# Patient Record
Sex: MALE | Race: WHITE
[De-identification: names, ages, dates, MRNs, and addresses within clinical notes are randomized per-mention and may not be internally consistent; named-entity substitution may affect disease eponyms.]

---

## 2019-11-20 ENCOUNTER — HOSPITAL ENCOUNTER (INPATIENT)
Dept: HOSPITAL 53 - M ED | Age: 34
LOS: 2 days | Discharge: HOME | DRG: 755 | End: 2019-11-22
Attending: PSYCHIATRY & NEUROLOGY | Admitting: PSYCHIATRY & NEUROLOGY
Payer: COMMERCIAL

## 2019-11-20 VITALS — WEIGHT: 175.49 LBS | BODY MASS INDEX: 26.6 KG/M2 | HEIGHT: 68 IN

## 2019-11-20 VITALS — DIASTOLIC BLOOD PRESSURE: 82 MMHG | SYSTOLIC BLOOD PRESSURE: 135 MMHG

## 2019-11-20 DIAGNOSIS — Z63.0: ICD-10-CM

## 2019-11-20 DIAGNOSIS — F11.20: ICD-10-CM

## 2019-11-20 DIAGNOSIS — Z79.899: ICD-10-CM

## 2019-11-20 DIAGNOSIS — M19.90: ICD-10-CM

## 2019-11-20 DIAGNOSIS — Z96.652: ICD-10-CM

## 2019-11-20 DIAGNOSIS — I10: ICD-10-CM

## 2019-11-20 DIAGNOSIS — F43.10: ICD-10-CM

## 2019-11-20 DIAGNOSIS — F43.20: Primary | ICD-10-CM

## 2019-11-20 DIAGNOSIS — F32.9: ICD-10-CM

## 2019-11-20 DIAGNOSIS — F17.210: ICD-10-CM

## 2019-11-20 LAB
ALBUMIN SERPL BCG-MCNC: 3.8 GM/DL (ref 3.2–5.2)
ALT SERPL W P-5'-P-CCNC: 38 U/L (ref 12–78)
AMPHETAMINES UR QL SCN: NEGATIVE
APAP SERPL-MCNC: < 2 UG/ML (ref 10–30)
BARBITURATES UR QL SCN: NEGATIVE
BENZODIAZ UR QL SCN: NEGATIVE
BILIRUB CONJ SERPL-MCNC: < 0.1 MG/DL (ref 0–0.2)
BILIRUB SERPL-MCNC: 0.2 MG/DL (ref 0.2–1)
BUN SERPL-MCNC: 9 MG/DL (ref 7–18)
BZE UR QL SCN: NEGATIVE
CALCIUM SERPL-MCNC: 9.2 MG/DL (ref 8.5–10.1)
CANNABINOIDS UR QL SCN: POSITIVE
CHLORIDE SERPL-SCNC: 106 MEQ/L (ref 98–107)
CO2 SERPL-SCNC: 31 MEQ/L (ref 21–32)
CREAT SERPL-MCNC: 0.96 MG/DL (ref 0.7–1.3)
ETHANOL SERPL-MCNC: < 0.003 % (ref 0–0.01)
GFR SERPL CREATININE-BSD FRML MDRD: > 60 ML/MIN/{1.73_M2} (ref 60–?)
GLUCOSE SERPL-MCNC: 103 MG/DL (ref 70–100)
HCT VFR BLD AUTO: 47.5 % (ref 42–52)
HGB BLD-MCNC: 15.8 G/DL (ref 13.5–17.5)
MCH RBC QN AUTO: 31.1 PG (ref 27–33)
MCHC RBC AUTO-ENTMCNC: 33.3 G/DL (ref 32–36.5)
MCV RBC AUTO: 93.5 FL (ref 80–96)
METHADONE UR QL SCN: NEGATIVE
OPIATES UR QL SCN: NEGATIVE
PCP UR QL SCN: NEGATIVE
PLATELET # BLD AUTO: 274 10^3/UL (ref 150–450)
POTASSIUM SERPL-SCNC: 4.8 MEQ/L (ref 3.5–5.1)
PROT SERPL-MCNC: 7.5 GM/DL (ref 6.4–8.2)
RBC # BLD AUTO: 5.08 10^6/UL (ref 4.3–6.1)
SALICYLATES SERPL-MCNC: 2.7 MG/DL (ref 5–30)
SODIUM SERPL-SCNC: 141 MEQ/L (ref 136–145)
TSH SERPL DL<=0.005 MIU/L-ACNC: 0.43 UIU/ML (ref 0.36–3.74)
WBC # BLD AUTO: 10.8 10^3/UL (ref 4–10)

## 2019-11-20 SDOH — SOCIAL STABILITY - SOCIAL INSECURITY: PROBLEMS IN RELATIONSHIP WITH SPOUSE OR PARTNER: Z63.0

## 2019-11-21 VITALS — SYSTOLIC BLOOD PRESSURE: 140 MMHG | DIASTOLIC BLOOD PRESSURE: 100 MMHG

## 2019-11-21 VITALS — DIASTOLIC BLOOD PRESSURE: 84 MMHG | SYSTOLIC BLOOD PRESSURE: 158 MMHG

## 2019-11-21 VITALS — DIASTOLIC BLOOD PRESSURE: 84 MMHG | SYSTOLIC BLOOD PRESSURE: 124 MMHG

## 2019-11-21 RX ADMIN — BUPRENORPHINE AND NALOXONE SCH TAB: 8; 2 TABLET SUBLINGUAL at 15:20

## 2019-11-21 RX ADMIN — VENLAFAXINE HYDROCHLORIDE SCH MG: 75 CAPSULE, EXTENDED RELEASE ORAL at 10:21

## 2019-11-21 RX ADMIN — BUPRENORPHINE AND NALOXONE SCH TAB: 8; 2 TABLET SUBLINGUAL at 20:39

## 2019-11-21 RX ADMIN — NICOTINE SCH PATCH: 21 PATCH, EXTENDED RELEASE TRANSDERMAL at 10:21

## 2019-11-21 NOTE — HPE
DATE OF ADMISSION: 11/20/2019

 

The patient is admitted to the inpatient mental health unit due to depression.

 

HISTORY OF PRESENT ILLNESS: This a 34-year-old male with history of 
polysubstance

abuse, undergoing Suboxone detoxification, active tobacco use. He denies any

medical complaints aside from withdrawal symptoms which is occasional nausea,

generalized malaise and aches, and occasional chills. No fever, dysuria, 
urgency,

frequency, chest pain, pressure or tightness, shortness of breath, palpitations,

lightheadedness, dizziness, upper or lower extremity weakness, gait ataxia,

changes in vision, changes in appetite, insomnia, hypersomnia.

 

PAST MEDICAL HISTORY: Suboxone detoxification, osteoarthritis.

 

PAST SURGICAL HISTORY: Left knee arthroscopy.

 

ALLERGIES: No known drug allergies.

 

HOSPITAL MEDICATIONS:

- acetaminophen 650 as needed for headache

- Mylanta 30 mL every four hours as needed for heartburn

- Suboxone one tablet sublingually twice a day

- flu shot

- milk of magnesia 30 mL daily as needed for constipation

- nicotine patch 21 mg daily

- trazodone 50 mg at bedtime as needed for insomnia

- Effexor 75 mg daily

 

SOCIAL HISTORY: The patient smokes a pack a day since age of 16. No alcohol use.

Recreational drug use with opiates. Currently on disability, previously a

.

 

FAMILY HISTORY: Father and mother alive, unknown medical problems. One sister 
and

one brother alive and well.

 

PHYSICAL EXAMINATION:

Temperature 98.5, pulse 77, respiratory rate 21, blood pressure 124/84, 100% on

room air.

GENERAL: Awake, alert, oriented to person, place and time.

Anicteric sclerae. No jaundice. No icterus. Moist mucous membranes.

Lungs are clear to auscultation. No wheezing, rales, or rhonchi.

HEART: S1, S2, sinus rhythm.

Abdomen is soft, nontender, nondistended.

EXTREMITIES: No clubbing, cyanosis, or any pitting edema.

 

LABORATORY DATA: White count 10, hemoglobin 15, hematocrit 47, platelet count

274.

 

Sodium 141, potassium 4.8, chloride 106, bicarbonate 31, BUN 9, creatinine 0.96,

glucose 103, calcium 9.2, total bilirubin 0.2, direct bilirubin less than 0.1,

AST 20, ALT 38, alkaline phosphatase 85, total protein 7.5, albumin 3.8, TSH

0.426.

 

Toxicology screen positive for cannabinoids.

 

ASSESSMENT AND PLAN: This is a 34-year-old male with history of polysubstance

abuse, positive marijuana, recreational drug use with opiates, active smoking,

admitted to the inpatient mental health unit, currently on Suboxone

detoxification.

 

IMPRESSION:

1. Depression, managed by psychiatrist. Continue all recommended medications.

 

2. History of osteoarthritis with left knee arthroscopy. No acute complaints.

 

3. Diet. Regular diet.

 

4. Deep vein thrombosis (DVT) prophylaxis. Encourage ambulation.

MTDD

## 2019-11-21 NOTE — MHHPEPDOC
[facility] History & Physical


History and Physical


DATE OF ADMISSION: Nov 20, 2019 at 22:00








New Patient


Leonides Fisher


MRN: N/A


Date of Birth: N/A


Date of Service: 11/21/2019


Chief Complaint


"I had a moment."





History of Present Illness


Patient is a 34-year-old man with a history of PTSD who presents after 

reportedly getting suicidal thoughts for short period that were primarily 

hopelessness. When further asked and described, the patient reports that he was 

under much stress with multiple financial stressors and his wife having 

addiction.





When I met with the patient, he reported that having good sleep and being 

restarted on his venlafaxine that he had felt was very helpful, he was feeling 

much improved. The patient states that he had had multiple stressors and that 

being  from them had helped him greatly. He reports that his initial 

depressed mood, irritability and insomnia that were present have resolved. The 

patient reports having a history of PTSD with combat and subsequent avoidance, 

hypervigilance and negative cognition about the future. He reports he has 

recently moved up from [place] and that he has been without his medications. He 

has been attempting to get medications through [facility] and has been 

in the Suboxone outpatient program.





Review Of Systems


Depression: As above.





Anxiety: Trauma related stressors and situational increased anxiety.





[place]: The patient denies any episodes of euphoria/dysphoria associated with 

decreased need for sleep, hedonism, talkatively or impulsivity lasting longer 

than 5 days.





Psychotic: The patient denies any experiences of auditory or visual 

hallucinations. They deny any episodes of paranoia or delusional thinking in the

past





Trauma: As above.





Borderline: The patient screens negative for borderline personality at this 

junction.





Past Psychiatric History


The patient reports having diagnosis of PTSD, currently on venlafaxine, 

previously on sertraline, has had outpatient in [place], reports having 1 

admission in the past. Denies any suicidal thoughts.





Allergies


Please see below.





Family Psychiatric History


The patient denies any history of mental health diagnoses, addictions or suicide

in the family.





Social History


The patient lives in the local area. He is  to his spouse, has 2 

children, subsists on [employer] disability for PTSD, has had a DUI in the past, grew 

up in a family with an estranged relationship to his father who he has no 

contact with. He has good relationship with mother, served in the army for 4 

years and was honorably discharged.





Substance Abuse History


The patient reports having an opioid dependence history, tobacco use and 

currently follows at outpatient addiction in [place].





Medical History


Has a history of hypertension.





Mental Status Examination


General: Well dressed with good hygiene





Speech: Spontaneous and fluid





Thought processes: Linear and logical





MSK: Smooth and coordinated gait, no signs of tremors or involuntary orofacial 

movements





Thought content: Future orientated





Abstract reasoning, and computation: Intact





Description of associations: Intact





Description of abnormal or psychotic thoughts: Denies any suicidal or homicidal 

ideation. Denies any auditory or visual hallucinations. Does not appear to be r

esponding to internal stimuli. Does not appear to be endorsing any bizarre or 

paranoid ideation.





Judgment: fair





Insight: fair





Orientation: Alert and orientated 3





Cognition: Grossly normal





Recent and remote memory: Intact





Attention span and concentration: Intact





Fund of knowledge: Adequate





Mood: "okay"





Affect: Euthymic with a full range





Diagnoses


Adjustment disorder, mild.





PTSD, chronic.





Opioid use disorder, on maintenance treatment.





Tobacco use disorder, severe.





Assessment and Plan


PTSD: We'll restart home medications.





Opioid use disorder: Restart home medications.





Disposition


Discharge tomorrow. Does not meet involuntary criteria for extension of his 

admission past 48 hours.





Problem List


1. Risk for suicide.





2. Depression.





3. Ineffective coping.





Initial Treatment Plan


1. Patient was admitted on a 9.39 legal status. 


2. Complete history was obtained.


3. With patients permission, family will be contacted and database will be 

expanded. 


4. Patients medication regimen will be reviewed and changed accordingly. 


5. Patient will be provided with protected environment. 


6. Patient will be treated with individual, group, and milieu therapies. 


7. Patient will receive supportive psych-education.


8. Discharge planning will commence immediately.


9. Outpatient follow-up treatment will be strongly recommended.


10. The initial treatment plan will focus initially on:





Estimated Length Of Stay


2 days.





Time Spent


70 minutes.





Thursday





Vital Signs





Vital Signs








  Date Time  Temp Pulse Resp B/P (MAP) Pulse Ox O2 Delivery O2 Flow Rate FiO2


 


11/21/19 06:52 98.5 77 12 124/84 (97)  Room Air  


 


11/20/19 18:48     100   











Laboratory Data


[facility]


Laboratory Tests 2


11/20/19 17:41: 


Nucleated Red Blood Cells % (auto) 0.0, Anion Gap 4L, Glomerular Filtration Rate

> 60.0, Calcium Level 9.2, Total Bilirubin 0.2, Direct Bilirubin < 0.1, 

Aspartate Amino Transf (AST/SGOT) 20, Alanine Aminotransferase (ALT/SGPT) 38, 

Alkaline Phosphatase 85, Total Protein 7.5, Albumin 3.8, Albumin/Globulin Ratio 

1.03, Thyroid Stimulating Hormone (TSH) 0.426, Salicylates Level 2.7L, Urine O

piates Screen NEGATIVE, Urine Methadone Screen NEGATIVE, Acetaminophen Level < 

2.0L, Urine Barbiturates Screen NEGATIVE, Urine Phencyclidine Screen NEGATIVE, 

Urine Amphetamines Screen NEGATIVE, Urine Benzodiazepines Screen NEGATIVE, Urine

Cocaine Metabolite Screen NEGATIVE, Urine Cannabinoids Screen POSITIVEH, Ethyl 

Alcohol Level < 0.003


CBC/BMP


Laboratory Tests


11/20/19 17:41











Medications


Scheduled


Buprenorphine HCl/Naloxone HCl (Suboxone 8 mg-2 mg Sl Film) 1 Each Film, 1 STRIP

SL BID for .


Lisinopril/Hydrochlorothiazide (Lisinopril-Hctz 10-12.5 mg Tab) 1 Each Tablet, 1

TAB PO DAILY for .


Omeprazole (Omeprazole) 20 Mg Capsule.dr, 20 MG PO DAILY, (Reported)


Trazodone HCl (Trazodone HCl) 50 Mg Tablet, 1 TAB PO QPM for sleep


Venlafaxine HCl (Venlafaxine HCl ER) 75 Mg Cap.er.24h, 75 MG PO DAILY for mood





Scheduled PRN


Naloxone HCl (Narcan) 4 Mg Spray, 4 MG NA ASDIRECTED PRN for OVERDOSE, (Reporte

d)


Naproxen Sodium (Aleve) 220 Mg Tablet, 440 MG PO BID PRN for PAIN, (Reported)





Allergies


Coded Allergies:  


     No Known Allergies (Verified  Allergy, Unknown, 11/20/19)











CROW ANDERSON DO              Nov 21, 2019 09:17

## 2019-11-22 VITALS — DIASTOLIC BLOOD PRESSURE: 65 MMHG | SYSTOLIC BLOOD PRESSURE: 119 MMHG

## 2019-11-22 RX ADMIN — NICOTINE SCH PATCH: 21 PATCH, EXTENDED RELEASE TRANSDERMAL at 08:09

## 2019-11-22 RX ADMIN — VENLAFAXINE HYDROCHLORIDE SCH MG: 75 CAPSULE, EXTENDED RELEASE ORAL at 08:09

## 2019-11-22 RX ADMIN — BUPRENORPHINE AND NALOXONE SCH TAB: 8; 2 TABLET SUBLINGUAL at 08:09

## 2019-12-21 ENCOUNTER — HOSPITAL ENCOUNTER (EMERGENCY)
Dept: HOSPITAL 53 - M ED | Age: 34
Discharge: PSYCHIATRIC OR SUBSTANCE USE FACILITY | End: 2019-12-21
Payer: MEDICAID

## 2019-12-21 VITALS — HEIGHT: 69 IN | BODY MASS INDEX: 26.72 KG/M2 | WEIGHT: 180.38 LBS

## 2019-12-21 VITALS — DIASTOLIC BLOOD PRESSURE: 74 MMHG | SYSTOLIC BLOOD PRESSURE: 140 MMHG

## 2019-12-21 DIAGNOSIS — F11.20: ICD-10-CM

## 2019-12-21 DIAGNOSIS — F43.10: ICD-10-CM

## 2019-12-21 DIAGNOSIS — M19.90: ICD-10-CM

## 2019-12-21 DIAGNOSIS — F17.210: ICD-10-CM

## 2019-12-21 DIAGNOSIS — Z79.899: ICD-10-CM

## 2019-12-21 DIAGNOSIS — F43.20: ICD-10-CM

## 2019-12-21 DIAGNOSIS — R45.851: Primary | ICD-10-CM

## 2019-12-21 LAB
ALBUMIN SERPL BCG-MCNC: 4.1 GM/DL (ref 3.2–5.2)
ALT SERPL W P-5'-P-CCNC: 29 U/L (ref 12–78)
AMPHETAMINES UR QL SCN: POSITIVE
APAP SERPL-MCNC: < 2 UG/ML (ref 10–30)
BARBITURATES UR QL SCN: NEGATIVE
BENZODIAZ UR QL SCN: NEGATIVE
BILIRUB CONJ SERPL-MCNC: < 0.1 MG/DL (ref 0–0.2)
BILIRUB SERPL-MCNC: 0.3 MG/DL (ref 0.2–1)
BUN SERPL-MCNC: 7 MG/DL (ref 7–18)
BZE UR QL SCN: NEGATIVE
CALCIUM SERPL-MCNC: 9.1 MG/DL (ref 8.5–10.1)
CANNABINOIDS UR QL SCN: POSITIVE
CHLORIDE SERPL-SCNC: 107 MEQ/L (ref 98–107)
CO2 SERPL-SCNC: 26 MEQ/L (ref 21–32)
CREAT SERPL-MCNC: 0.97 MG/DL (ref 0.7–1.3)
ETHANOL SERPL-MCNC: < 0.003 % (ref 0–0.01)
GFR SERPL CREATININE-BSD FRML MDRD: > 60 ML/MIN/{1.73_M2} (ref 60–?)
GLUCOSE SERPL-MCNC: 93 MG/DL (ref 70–100)
HCT VFR BLD AUTO: 55 % (ref 42–52)
HGB BLD-MCNC: 18.3 G/DL (ref 13.5–17.5)
MCH RBC QN AUTO: 30.4 PG (ref 27–33)
MCHC RBC AUTO-ENTMCNC: 33.3 G/DL (ref 32–36.5)
MCV RBC AUTO: 91.4 FL (ref 80–96)
METHADONE UR QL SCN: NEGATIVE
OPIATES UR QL SCN: NEGATIVE
PCP UR QL SCN: NEGATIVE
PLATELET # BLD AUTO: 271 10^3/UL (ref 150–450)
POTASSIUM SERPL-SCNC: 4.2 MEQ/L (ref 3.5–5.1)
PROT SERPL-MCNC: 7.9 GM/DL (ref 6.4–8.2)
RBC # BLD AUTO: 6.02 10^6/UL (ref 4.3–6.1)
SALICYLATES SERPL-MCNC: < 1.7 MG/DL (ref 5–30)
SODIUM SERPL-SCNC: 140 MEQ/L (ref 136–145)
TSH SERPL DL<=0.005 MIU/L-ACNC: 0.9 UIU/ML (ref 0.36–3.74)
WBC # BLD AUTO: 11 10^3/UL (ref 4–10)

## 2019-12-21 PROCEDURE — 85027 COMPLETE CBC AUTOMATED: CPT

## 2019-12-21 PROCEDURE — 80307 DRUG TEST PRSMV CHEM ANLYZR: CPT

## 2019-12-21 PROCEDURE — 36415 COLL VENOUS BLD VENIPUNCTURE: CPT

## 2019-12-21 PROCEDURE — 93005 ELECTROCARDIOGRAM TRACING: CPT

## 2019-12-21 PROCEDURE — 80076 HEPATIC FUNCTION PANEL: CPT

## 2019-12-21 PROCEDURE — 84443 ASSAY THYROID STIM HORMONE: CPT

## 2019-12-21 PROCEDURE — 80048 BASIC METABOLIC PNL TOTAL CA: CPT

## 2019-12-21 PROCEDURE — 99285 EMERGENCY DEPT VISIT HI MDM: CPT

## 2019-12-21 NOTE — ECGEPIP
[facility] - ED

                                       

                                       Test Date:    2019

Pat Name:     DAVID NAVARRETE           Department:   

Patient ID:   [id number]                 Room:         -

Gender:       Male                     Technician:   pawan

:          1985               Requested By: NAEL RODRÍGUEZ

Order Number: [id number]     Reading MD:   Chris Royal

                                 Measurements

Intervals                              Axis          

Rate:         88                       P:            42

KS:           124                      QRS:          46

QRSD:         75                       T:            59

QT:           337                                    

QTc:          409                                    

                           Interpretive Statements

SINUS RHYTHM

NONSPECIFIC T-WAVE ABNORMALITY

Comparison tracing not on file

Electronically Signed on 2019 19:07:51 EST by Chris Royal

## 2021-06-27 ENCOUNTER — HOSPITAL ENCOUNTER (EMERGENCY)
Dept: HOSPITAL 53 - M ED | Age: 36
LOS: 1 days | Discharge: ANOTHER HOSPITAL | End: 2021-06-28
Payer: COMMERCIAL

## 2021-06-27 VITALS — HEIGHT: 68 IN | BODY MASS INDEX: 24.3 KG/M2 | WEIGHT: 160.34 LBS

## 2021-06-27 DIAGNOSIS — E87.1: ICD-10-CM

## 2021-06-27 DIAGNOSIS — M25.511: ICD-10-CM

## 2021-06-27 DIAGNOSIS — L03.115: Primary | ICD-10-CM

## 2021-06-27 DIAGNOSIS — F15.10: ICD-10-CM

## 2021-06-27 DIAGNOSIS — A41.9: ICD-10-CM

## 2021-06-27 LAB
BUN SERPL-MCNC: 24 MG/DL (ref 7–18)
CALCIUM SERPL-MCNC: 8 MG/DL (ref 8.5–10.1)
CHLORIDE SERPL-SCNC: 91 MEQ/L (ref 98–107)
CO2 SERPL-SCNC: 25 MEQ/L (ref 21–32)
CREAT SERPL-MCNC: 0.89 MG/DL (ref 0.7–1.3)
CRP SERPL-MCNC: 15.3 MG/DL (ref 0–0.3)
GFR SERPL CREATININE-BSD FRML MDRD: > 60 ML/MIN/{1.73_M2} (ref 60–?)
GLUCOSE SERPL-MCNC: 109 MG/DL (ref 70–100)
HCT VFR BLD AUTO: 46.8 % (ref 42–52)
HGB BLD-MCNC: 15.7 G/DL (ref 13.5–17.5)
LYMPHOCYTES NFR BLD MANUAL: 10 % (ref 16–44)
MCH RBC QN AUTO: 28.6 PG (ref 27–33)
MCHC RBC AUTO-ENTMCNC: 33.5 G/DL (ref 32–36.5)
MCV RBC AUTO: 85.4 FL (ref 80–96)
MONOCYTES NFR BLD MANUAL: 4 % (ref 0–5)
NEUTROPHILS NFR BLD MANUAL: 83 % (ref 28–66)
PLATELET # BLD AUTO: (no result) 10^3/UL (ref 150–450)
PLATELET BLD QL SMEAR: (no result)
PLATELET CLUMP BLD QL SMEAR: (no result)
POTASSIUM SERPL-SCNC: 3.7 MEQ/L (ref 3.5–5.1)
RBC # BLD AUTO: 5.48 10^6/UL (ref 4.3–6.1)
RBC MORPH BLD: NORMAL
RSV RNA NPH QL NAA+PROBE: NEGATIVE
SODIUM SERPL-SCNC: 128 MEQ/L (ref 136–145)
WBC # BLD AUTO: 16.8 10^3/UL (ref 4–10)

## 2021-06-27 PROCEDURE — 72148 MRI LUMBAR SPINE W/O DYE: CPT

## 2021-06-27 PROCEDURE — 71045 X-RAY EXAM CHEST 1 VIEW: CPT

## 2021-06-27 PROCEDURE — 72141 MRI NECK SPINE W/O DYE: CPT

## 2021-06-27 PROCEDURE — 85025 COMPLETE CBC W/AUTO DIFF WBC: CPT

## 2021-06-27 PROCEDURE — 93041 RHYTHM ECG TRACING: CPT

## 2021-06-27 PROCEDURE — 83605 ASSAY OF LACTIC ACID: CPT

## 2021-06-27 PROCEDURE — 81001 URINALYSIS AUTO W/SCOPE: CPT

## 2021-06-27 PROCEDURE — 96372 THER/PROPH/DIAG INJ SC/IM: CPT

## 2021-06-27 PROCEDURE — 87077 CULTURE AEROBIC IDENTIFY: CPT

## 2021-06-27 PROCEDURE — 96365 THER/PROPH/DIAG IV INF INIT: CPT

## 2021-06-27 PROCEDURE — 96375 TX/PRO/DX INJ NEW DRUG ADDON: CPT

## 2021-06-27 PROCEDURE — 99285 EMERGENCY DEPT VISIT HI MDM: CPT

## 2021-06-27 PROCEDURE — 86140 C-REACTIVE PROTEIN: CPT

## 2021-06-27 PROCEDURE — 73630 X-RAY EXAM OF FOOT: CPT

## 2021-06-27 PROCEDURE — 87040 BLOOD CULTURE FOR BACTERIA: CPT

## 2021-06-27 PROCEDURE — 94760 N-INVAS EAR/PLS OXIMETRY 1: CPT

## 2021-06-27 PROCEDURE — 93005 ELECTROCARDIOGRAM TRACING: CPT

## 2021-06-27 PROCEDURE — 96366 THER/PROPH/DIAG IV INF ADDON: CPT

## 2021-06-27 PROCEDURE — 87186 SC STD MICRODIL/AGAR DIL: CPT

## 2021-06-27 PROCEDURE — 70491 CT SOFT TISSUE NECK W/DYE: CPT

## 2021-06-27 PROCEDURE — 96368 THER/DIAG CONCURRENT INF: CPT

## 2021-06-27 PROCEDURE — 73030 X-RAY EXAM OF SHOULDER: CPT

## 2021-06-27 PROCEDURE — 87631 RESP VIRUS 3-5 TARGETS: CPT

## 2021-06-27 PROCEDURE — 36415 COLL VENOUS BLD VENIPUNCTURE: CPT

## 2021-06-27 PROCEDURE — 72146 MRI CHEST SPINE W/O DYE: CPT

## 2021-06-27 PROCEDURE — 80048 BASIC METABOLIC PNL TOTAL CA: CPT

## 2021-06-27 PROCEDURE — 96361 HYDRATE IV INFUSION ADD-ON: CPT

## 2021-06-27 PROCEDURE — 96367 TX/PROPH/DG ADDL SEQ IV INF: CPT

## 2021-06-27 NOTE — REP
INDICATION:

pain



COMPARISON:

None.



TECHNIQUE:

Three limited views of the right shoulder.



FINDINGS:

No acute fracture or dislocation.  The acromioclavicular and glenohumeral joints are

intact.  No periarticular calcifications or degenerative changes are appreciated.  Sub

acromial space is normal.  Surrounding soft tissues are unremarkable.



IMPRESSION:

No obvious acute fracture or dislocation.  No obvious degenerative changes.





<Electronically signed by Mayco Cardoza > 06/27/21 [id number]

## 2021-06-27 NOTE — REPVR
PROCEDURE INFORMATION: 

Exam: XR Chest 

Exam date and time: 6/27/2021 9:58 PM 

Age: 35 years old 

Clinical indication: Fever; Additional info: Chest pain, fever, lactic acidosis 



TECHNIQUE: 

Imaging protocol: XR of the chest. 

Views: 1 view. 



COMPARISON: 

CR Shoulder, complete 6/27/2021 4:13 PM 



FINDINGS: 

Lungs: There is decreased inflation of the lungs. No focal infiltrates. 

Pleural spaces: Unremarkable. No pleural effusion. No pneumothorax. 

Heart/Mediastinum: Unremarkable. No cardiomegaly. 

Bones/joints: Unremarkable. 



IMPRESSION: 

Negative poor inspiratory chest. 



Electronically signed by: Marcellus Peterson On 06/27/2021  23:15:50 PM

## 2021-06-27 NOTE — REP
INDICATION:

pain



COMPARISON:

None.



TECHNIQUE:

AP, lateral, bilateral oblique views right foot.



FINDINGS:

The osseous structures and joint spaces are intact and normal.  There is no evidence

for acute fracture or dislocation.  Surrounding soft tissues are unremarkable.  No

subcutaneous emphysema or radiodense foreign body.



IMPRESSION:

Age-appropriate right foot radiographs.  No acute fracture or dislocation.





<Electronically signed by Mayco Cardoza > 06/27/21 [id number]

## 2021-06-28 VITALS — SYSTOLIC BLOOD PRESSURE: 119 MMHG | DIASTOLIC BLOOD PRESSURE: 62 MMHG

## 2021-06-28 NOTE — REPVR
PROCEDURE INFORMATION: 

Exam: CT Neck With Contrast 

Exam date and time: 6/28/2021 6:33 AM 

Age: 35 years old 

Clinical indication: Other: R/O prevetebral abcess 



TECHNIQUE: 

Imaging protocol: Computed tomography images of the neck with contrast. 

Radiation optimization: All CT scans at this facility use at least one of these 

dose optimization techniques: automated exposure control; mA and/or kV 

adjustment per patient size (includes targeted exams where dose is matched to 

clinical indication); or iterative reconstruction. 

Contrast material: ISOVUE 370; Contrast volume: 75 ml; Contrast route: 

INTRAVENOUS (IV);  



COMPARISON: 

MRI-Spine,Cervical without con 6/28/2021 12:56 AM 



FINDINGS: 

Mastoid air cells: The mastoid air cells are clear. 

Paranasal sinuses: The visualized paranasal sinuses are clear. 

Nasopharynx: The nasopharynx appears unremarkable. 

Oropharynx: The oropharynx appears unremarkable. There is no enlargement of the 

tonsils. 

Hypopharynx: Unremarkable. 

Larynx: The larynx and epiglottis appear unremarkable. 

Retropharyngeal space: There is a peripherally enhancing, irregularly shaped 

collection which appears to be predominantly within the retropharyngeal space, 

from approximately the C3-C4 level through the T1 level. At its largest, the 

collection spans 4.8 cm transverse and 1.4 cm AP and approximately 6 cm 

craniocaudal. It is predominantly in the midline superiorly but extends toward 

the left side inferiorly, involving the superior mediastinum. There is a 

suggestion of involvement of the left prevertebral space in the lower cervical 

spine. There is thickening and diffuse infiltration of the retropharyngeal fat 

beginning at the level of the inferior oropharynx/hypopharynx and extending 

into the superior mediastinum. 

Submandibular/Parotid glands: The submandibular glands and the parotid glands 

are unremarkable and symmetric bilaterally. 

Thyroid: The thyroid gland is normal. 

Lymph nodes: No lymphadenopathy. 



Trachea: The trachea is normal. 

Lungs: The visualized lungs are grossly clear. 

Bones/joints: See Retropharyngeal space findings. There are endplate spurs at 

multiple levels in the cervical spine. There is an unfused apophysis at the 

C5-C6 level. No erosive changes of the bones are identified. 

Vasculature: The cervical carotid and vertebral arteries are patent. There is 

no definite tissue plane between the inflamed tissue and the left carotid 

space. On 1 axial image, there is a question of an irregularity of the 

posterior wall of the left common carotid artery (image 62 of series 201), and 

there is edema extending into the left carotid space on the prior MRI. 

Soft tissues: There is a focus of gas in the left lower cervical tissues, which 

is lateral to the collection and may be within a small vein, but it raises the 

possibility of infection with a gas producing organism (images 54-57 of series 

201 and images 59-61 of series 203). 



IMPRESSION: 

1. Peripherally enhancing fluid collection consistent with abscess which 

appears to be centered in the retropharyngeal space but extends into the 

superior mediastinum and may involve the left prevertebral space and the left 

carotid space. 

2. An equivocal irregularity of the mid left common carotid artery which raises 

the possibility of infectious involvement of the wall of the carotid artery. No 

associated luminal narrowing is identified. 

3. No epidural collections identified on this exam or on the prior MRI. 

4. Small focus of gas within the soft tissues in the left lower neck, which is 

lateral to the above described process and may be incidental air within a small 

vein, but a small amount of gas related to a gas producing organism showed be 

considered. 



COMMENTS: 

THIS REPORT CONTAINS FINDINGS THAT MAY BE CRITICAL TO PATIENT CARE. The 

findings were verbally communicated via telephone conference with DIONNE FIORE at 7:15 AM EDT on 6/28/2021. The findings were acknowledged and 

understood. 



Electronically signed by: Seda Meyer On 06/28/2021  07:21:12 AM

## 2021-06-28 NOTE — REPVR
PROCEDURE INFORMATION: 

Exam: MR Lumbar Spine Without Contrast 

Exam date and time: 6/28/2021 2:15 AM 

Age: 35 years old 

Clinical indication: Low back pain and other: Lt hip pain; Patient HX: HX of iv 

drug use, PT states pain everywhere most concentrated in the left lower back 

area; Additional info: R/O epidural abscess 



TECHNIQUE: 

Imaging protocol: Multiplanar magnetic resonance images of the lumbar spine 

without intravenous contrast. 



COMPARISON: 

No relevant prior studies available. 



FINDINGS: 

Limitations: There is motion artifact. 



Vertebrae: There is normal alignment of the visualized spine. 

Spinal epidural space: No epidural collections are identified. 

Spinal cord: The conus medullaris terminates at the L1 level. The distal cord 

appears unremarkable. 

T12-L1: Imaged on the sagittal images only. Unremarkable. 

L1-L2: There is no disc bulge or herniation. The facet joints appear normal. No 

central or foraminal narrowing. 

L2-L3: There is no disc bulge or herniation. The facet joints appear normal. No 

central or foraminal narrowing. 

L3-L4: There is no disc bulge or herniation. There is mild hypertrophy of the 

facet joints. No central or foraminal narrowing. 

L4-L5: The disc is desiccated and narrowed. There is diffuse disc bulge and a 

central to left paracentral disc protrusion associated with peripheral high 

signal in the disc, indicating an annular tear. There is mild hypertrophy of 

the ligamentum flavum and of the facet joints. There is moderate central canal 

stenosis and mild bilateral neural foraminal narrowing. 

L5-S1: The disc is desiccated and narrowed. There is a small diffuse bulge of 

the disc and a small, broad-based left subarticular to foraminal disc 

protrusion, associated with peripheral high signal in the disc, indicating an 

annular tear. The facet joints appear unremarkable. There is mild central canal 

stenosis. The right neural foramen is patent. There is moderate left neural 

foraminal narrowing. 

Soft tissues: There is hyperintense signal on the T2 and STIR images in the 

left paraspinous musculature, extending from at least T12 through S1, and 

similar right-sided paraspinous muscular edema at L4 and L5. There is no 

evidence of focal fluid collections to suggest abscess formation. 



IMPRESSION: 

1. No epidural abscess identified. 

2. Degenerative disc disease at L4-L5 and L5-S1 with disc narrowing and 

desiccation at both levels. A central to left paracentral disc protrusion and 

annular tear at L4-L5 results in moderate central canal stenosis and mild 

bilateral neural foraminal narrowing. A left subarticular to foraminal disc 

protrusion at L5-S1 results in mild central canal stenosis and moderate left 

neural foraminal narrowing. 

3. Edema within the paraspinous musculature extending over a broad area on the 

left side and in the lower lumbar spine on the right side, of uncertain 

etiology. Consider posttraumatic muscle strain, an infectious/inflammatory 

myositis, or denervation. 



Electronically signed by: Seda Meyer On 06/28/2021  04:32:07 AM

## 2021-06-28 NOTE — REPVR
PROCEDURE INFORMATION: 

Exam: MR Cervical Spine Without Contrast 

Exam date and time: 6/28/2021 2:15 AM 

Age: 35 years old 

Clinical indication: Other: R/O epidural abscess 



TECHNIQUE: 

Imaging protocol: Multiplanar magnetic resonance images of the cervical spine 

without contrast. 



COMPARISON: 

CR Chest, 1 view 6/27/2021 9:50 PM 



FINDINGS: 

Limitations: There is significant motion artifact. The technologist notes the 

patient was agitated and further imaging was not possible. 



Vertebrae: There is loss of the cervical lordosis with a mild kyphosis from C3 

through C5 without subluxations. There are anterior endplate spurs and are mild 

signal changes associated with the endplates from C3 through C7. 

Spinal cord: The cervical spinal cord is grossly normal in signal. 

Epidural space: No epidural collections are identified. 

Discs/Spinal canal/Neural foramina: There is no fluid signal associated with 

the discs. There are small posterior disc osteophyte complexes at C4-C5 through 

C6-C7.  There is uncovertebral ridging at C3-C4, more prominent to the left 

side, which results in left-sided neural foraminal narrowing. There is mild 

stenosis of the spinal canal C3-C4 through C7. 



Vasculature: Expected flow voids in the vertebral arteries.  

Soft tissues:  There is retropharyngeal/prevertebral complex fluid extending 

from C2 through T1, and associated swelling which is maximal in the midline at 

C4 and C5 and extends to the left of midline at C6 and C7. 



IMPRESSION: 

1. There is a broad area of prevertebral swelling and fluid, suspicious for a 

retropharyngeal/prevertebral abscess.  There is no definite involvement of the 

cervical discs, but the assessment is limited by significant motion artifact 

and the lack of IV contrast. As the technologist notes the patient was agitated 

and was not able to tolerate further image, a CT scan of the neck with contrast 

or a repeat MRI of the cervical spine with sedation and including images with 

and without contrast is recommended for further evaluation. 

2. No epidural collections identified. 

3. Multilevel degenerative disc disease with mild stenosis of the spinal canal 

from C3-C4 through C6-C7. 



Electronically signed by: Seda Meyer On 06/28/2021  05:11:04 AM

## 2021-06-28 NOTE — REPVR
PROCEDURE INFORMATION: 

Exam: MR Thoracic Spine Without Contrast 

Exam date and time: 6/28/2021 2:15 AM 

Age: 35 years old 

Clinical indication: Other: R/O epidural abscess 



TECHNIQUE: 

Imaging protocol: Multiplanar magnetic resonance images of the thoracic spine 

without contrast. 



COMPARISON: 

CR Chest, 1 view 6/27/2021 9:50 PM 



FINDINGS: 

Limitations: There is motion artifact. 



Vertebrae: Vertebral body heights are normal. Schmorl's nodes are seen at 

several levels, including the superior endplate of T6 and the inferior 

endplates T7 through T10. There are anterior endplate spurs and mild signal 

changes at T4-T5 which appear degenerative, are hyperintense on both T1 and T2. 

Epidural space: There is no evidence of epidural collection or hemorrhage. 

Spinal cord: The visualized spinal cord is normal in signal and morphology. 

Discs/Spinal canal/Neural foramina: The T5-T6 disc appears desiccated and 

narrowed. There are small anterior endplate spurs. Peripheral high signal and a 

small disc protrusion are seen to the right of midline posteriorly at T5-T6, 

visualized on the sagittal sequences, but not well seen on the axial sequences. 

The T6-T7 disc is desiccated and narrowed. There is a right paracentral disc 

protrusion or disc osteophyte complex which contacts the cord but does not 

result in significant stenosis of the spinal canal. The T8-T9 disc is mildly 

desiccated and mildly narrowed. There is a minimal posterior disc osteophyte 

complex at T8-T9, not resulting in significant central or foraminal stenosis. 

The neural foramen are patent at each level. 

Soft tissues: Left paraspinous muscular edema is noted at the T12 level and 

extending into the visualized upper lumbar region. 



IMPRESSION: 

1. Unremarkable appearance of the thoracic spinal cord. No epidural collections 

identified. 

2. Mild, multilevel degenerative disc disease. No significant spinal canal 

stenosis. 

3. Left paraspinous muscular edema at T12 and extending into the upper lumbar 

spine. Please see the report for the MRI of the lumbar spine. 



Electronically signed by: Seda Meyer On 06/28/2021  04:45:20 AM

## 2021-06-28 NOTE — CR.PDOC
General


Date of Consultation:  Jun 28, 2021





Consultation


This note is for documentation purposes only. No billing was performed.





Patient was seen at bedside at 23:15 on 6/27/21. 34 yo M with  hx of IVDU 

(heroin), chronic back pain, depression, presents to the ER with a 4 day history

of generalized myalgia, chills, subjective fevers, neck pain, back pain and 

diaphoresis with related R shoulder and R ankle pain, swelling and limited range

of motion. He also complains of 9/10 low back pain and inability to walk. Upon 

presentation he was found to be febrile to 100.3, pulse 106, /81, 97% on 

RA. WBC 16.9. Hgb 15.7. LA 3.8. Na 128. K 3.7. CRP 15.3. Blood cultures were 

sent, and empiric vancomycin was given for sepsis with presumed infective 

endocarditis in the setting of IVDU. 





I discussed the exam with ER physician Dr. Smart. Given sepsis in setting of 

IVDU, c/o low back pain, neck pain and inability to ambulate, I recommended MRI 

with contrast imaging of the spine to r/o infectious process ie spinal abscess 

or discitis. MR studies wo contrast were ordered. MR C-spine showed a possible 

retropharyngeal/prevertebral abscess, CANNOT rule out vertebral involvement. I 

was called by radiologist Dr. Seda Meyer and was relayed these findings as 

well. She states that patient needs  CT w IV contrast to r/o spinal involvement.

 Due to this reason, patient is not admitted to hospitalist service until triage

is complete as patient may require transfer to higher level of care with 

neurosurgery/spinal surgery services. I conveyed this to Dr. Smart.





Vital Signs/I&O





Vital Signs








  Date Time  Temp Pulse Resp B/P (MAP) Pulse Ox O2 Delivery O2 Flow Rate FiO2


 


6/28/21 06:45  79  140/86 (104)    


 


6/28/21 05:13 100.3       


 


6/28/21 04:30     80   


 


6/27/21 21:44   16   Room Air  














I&O- Last 24 Hours up to 6 AM 


 


 6/28/21





 06:00


 


Intake Total 1550 ml


 


Balance 1550 ml











Laboratory Data


Labs 24H


Laboratory Tests 2


6/27/21 19:32: 


Neutrophils (%) (Auto) , Nucleated Red Blood Cells % (auto) 0.0, Neutrophils 

83H, Band Neutrophils 3, Lymphocytes (Manual) 10L, Monocytes (Manual) 4, Red 

Blood Cell Morphology NORMAL, Platelet Estimate INVALID, Clumped Platelets LARGE

AMT, Anion Gap 12, Glomerular Filtration Rate > 60.0, Lactic Acid Level 3.8*H, 

Calcium Level 8.0L, C-Reactive Protein, Quantitative 15.30H


6/27/21 20:35: 


Urine Color YELLOW, Urine Appearance CLEAR, Urine pH 6.0, Urine Specific Gravity

1.013, Urine Protein 1+H, Urine Glucose (UA) NEGATIVE, Urine Ketones NEGATIVE, 

Urine Blood 2+H, Urine Nitrite NEGATIVE, Urine Bilirubin NEGATIVE, Urine 

Urobilinogen 0.2, Urine Leukocyte Esterase NEGATIVE, Urine WBC (Auto) 3, Urine 

RBC (Auto) 1, Urine Hyaline Casts (Auto) 0, Urine Bacteria (Auto) NEGATIVE, 

Urine Squamous Epithelial Cells 0, Urine Sperm (Auto) 


6/27/21 21:27: 


Coronavirus (COVID-19)(PCR) NEGATIVE, Influenza Type A (RT-PCR) NEGATIVE, 

Influenza Type B (RT-PCR) NEGATIVE, Respiratory Syncytial Virus (PCR) NEGATIVE


CBC/BMP


Laboratory Tests


6/27/21 19:32








Microbiology





Microbiology


6/27/21 Blood Culture, Received


          Pending


6/27/21 Blood Culture, Received


          Pending





Allergies


Coded Allergies:  


     No Known Allergies (Verified  Allergy, Unknown, 11/20/19)





Home Medications


Unable to Obtain Active Prescriptions or Reported Meds











JOSE WHEELER MD       Jun 28, 2021 07:11

## 2021-07-13 ENCOUNTER — HOSPITAL ENCOUNTER (EMERGENCY)
Dept: HOSPITAL 53 - M ED | Age: 36
LOS: 1 days | Discharge: HOME | End: 2021-07-14
Payer: COMMERCIAL

## 2021-07-13 VITALS — HEIGHT: 68 IN | BODY MASS INDEX: 21.26 KG/M2 | WEIGHT: 140.3 LBS

## 2021-07-13 DIAGNOSIS — R53.81: Primary | ICD-10-CM

## 2021-07-13 DIAGNOSIS — M25.511: ICD-10-CM

## 2021-07-13 DIAGNOSIS — F15.10: ICD-10-CM

## 2021-07-13 DIAGNOSIS — F17.210: ICD-10-CM

## 2021-07-13 PROCEDURE — 36415 COLL VENOUS BLD VENIPUNCTURE: CPT

## 2021-07-13 PROCEDURE — 93005 ELECTROCARDIOGRAM TRACING: CPT

## 2021-07-13 PROCEDURE — 85049 AUTOMATED PLATELET COUNT: CPT

## 2021-07-13 PROCEDURE — 93970 EXTREMITY STUDY: CPT

## 2021-07-13 PROCEDURE — 71045 X-RAY EXAM CHEST 1 VIEW: CPT

## 2021-07-13 PROCEDURE — 93041 RHYTHM ECG TRACING: CPT

## 2021-07-13 PROCEDURE — 85025 COMPLETE CBC W/AUTO DIFF WBC: CPT

## 2021-07-13 PROCEDURE — 80048 BASIC METABOLIC PNL TOTAL CA: CPT

## 2021-07-13 PROCEDURE — 99285 EMERGENCY DEPT VISIT HI MDM: CPT

## 2021-07-13 PROCEDURE — 82550 ASSAY OF CK (CPK): CPT

## 2021-07-13 PROCEDURE — 94760 N-INVAS EAR/PLS OXIMETRY 1: CPT

## 2021-07-13 PROCEDURE — 82553 CREATINE MB FRACTION: CPT

## 2021-07-14 VITALS — SYSTOLIC BLOOD PRESSURE: 138 MMHG | DIASTOLIC BLOOD PRESSURE: 89 MMHG

## 2021-07-14 LAB
BASOPHILS # BLD AUTO: 0.1 10^3/UL (ref 0–0.2)
BASOPHILS NFR BLD AUTO: 0.5 % (ref 0–1)
BUN SERPL-MCNC: 9 MG/DL (ref 7–18)
CALCIUM SERPL-MCNC: 9 MG/DL (ref 8.5–10.1)
CHLORIDE SERPL-SCNC: 102 MEQ/L (ref 98–107)
CK MB CFR.DF SERPL CALC: 4
CK MB SERPL-MCNC: < 1 NG/ML (ref ?–3.6)
CK SERPL-CCNC: 25 U/L (ref 39–308)
CO2 SERPL-SCNC: 30 MEQ/L (ref 21–32)
CREAT SERPL-MCNC: 0.67 MG/DL (ref 0.7–1.3)
EOSINOPHIL # BLD AUTO: 0.2 10^3/UL (ref 0–0.5)
EOSINOPHIL NFR BLD AUTO: 1.6 % (ref 0–3)
GFR SERPL CREATININE-BSD FRML MDRD: > 60 ML/MIN/{1.73_M2} (ref 60–?)
GLUCOSE SERPL-MCNC: 104 MG/DL (ref 70–100)
HCT VFR BLD AUTO: 31.8 % (ref 42–52)
HGB BLD-MCNC: 9.9 G/DL (ref 13.5–17.5)
LYMPHOCYTES # BLD AUTO: 3.4 10^3/UL (ref 1.5–5)
LYMPHOCYTES NFR BLD AUTO: 32.9 % (ref 24–44)
MCH RBC QN AUTO: 27.7 PG (ref 27–33)
MCHC RBC AUTO-ENTMCNC: 31.1 G/DL (ref 32–36.5)
MCV RBC AUTO: 88.8 FL (ref 80–96)
MONOCYTES # BLD AUTO: 0.8 10^3/UL (ref 0–0.8)
MONOCYTES NFR BLD AUTO: 8 % (ref 2–8)
NEUTROPHILS # BLD AUTO: 5.9 10^3/UL (ref 1.5–8.5)
NEUTROPHILS NFR BLD AUTO: 56.7 % (ref 36–66)
PLATELET # BLD AUTO: (no result) 10^3/UL (ref 150–450)
PLATELET # BLD AUTO: (no result) 10^3/UL (ref 172–450)
PLATELET # BLD AUTO: (no result) K/MM3 (ref 172–450)
POTASSIUM SERPL-SCNC: 4.8 MEQ/L (ref 3.5–5.1)
RBC # BLD AUTO: 3.58 10^6/UL (ref 4.3–6.1)
SODIUM SERPL-SCNC: 139 MEQ/L (ref 136–145)
TROPONIN I SERPL-MCNC: < 0.02 NG/ML (ref ?–0.1)
WBC # BLD AUTO: 10.3 10^3/UL (ref 4–10)

## 2021-07-14 NOTE — ECGEPIP
[facility] - ED

                                       

                                       Test Date:    2021

Pat Name:     DAVID NAVARRETE           Department:   

Patient ID:   [id number]                 Room:         -

Gender:       Male                     Technician:   [id number]

:          1985               Requested By: KASANDRA LIGHT

Order Number: [id number]     Reading MD:   Royce Tipton

                                 Measurements

Intervals                              Axis          

Rate:         92                       P:            56

MA:           144                      QRS:          48

QRSD:         70                       T:            54

QT:           358                                    

QTc:          442                                    

                           Interpretive Statements

Normal sinus rhythm

SIMILAR TO 21

Electronically Signed on 2021 20:44:50 EDT by Royce Tipton

## 2021-07-14 NOTE — REPVR
PROCEDURE INFORMATION: 

Exam: XR Chest 

Exam date and time: 7/14/2021 2:36 AM 

Age: 35 years old 

Clinical indication: Pain; Angina pectoris; Additional info: Chest pain 



TECHNIQUE: 

Imaging protocol: XR of the chest. 

Views: 1 view. 



COMPARISON: 

CR Chest, 1 view 6/27/2021 9:50 PM 



FINDINGS: 

Lungs: Unremarkable. No consolidation. 

Pleural spaces: Unremarkable. No pleural effusion. No pneumothorax. 

Heart/Mediastinum: Unremarkable. No cardiomegaly. 

Bones/joints: Unremarkable. 



IMPRESSION: 

No acute findings. 



Electronically signed by: Seda Meyer On 07/14/2021  06:47:17 AM

## 2021-07-14 NOTE — REPVR
PROCEDURE INFORMATION: 

Exam: US Duplex Upper Extremity Veins 

Exam date and time: 7/14/2021 4:36 AM 

Age: 35 years old 

Clinical indication: Pain; Arm, upper; Bilateral; Additional info: B/l arm 

pain, ? HX of dvt 



TECHNIQUE: 

Imaging protocol: Real-time Duplex ultrasound of the Upper Extremities with 2-D 

gray scale, color Doppler flow and spectral waveform analysis with image 

documentation. Complete exam focused on the bilateral upper extremity veins. 



COMPARISON: 

CR Shoulder, complete 6/27/2021 4:13 PM 



FINDINGS: 

Right deep veins: Unremarkable. Axillary and brachial veins are patent 

throughout without thrombus. Normal Doppler waveforms. Normal compressibility 

and/or augmentation response. Visualized internal jugular and subclavian veins 

are patent. 

Right superficial veins: Unremarkable. Visualized cephalic and basilic veins 

are patent without thrombus. 



Left deep veins: Unremarkable. Axillary and brachial veins are patent 

throughout without thrombus. Normal Doppler waveforms. Normal compressibility 

and/or augmentation response. Visualized internal jugular and subclavian veins 

are patent. 

Left superficial veins:  The basilic vein is patent without thrombus.  A patent 

left cephalic vein could not be identified.



Soft tissues: There is a complex fluid collection with thick internal 

septations anteriorly at the right shoulder which measures 2.4 x 1.4 x 3.9 cm. 

This collection appears to be deep the subcutaneous tissues and may be probably 

intramuscular or between muscle planes. On color Doppler imaging, no 

significant associated hyperemia is seen. There is a small area of apparent 

blood flow in an internal septation on color Doppler imaging but no 

corresponding blood flow is seen on pulsed Doppler. 



IMPRESSION: 

1. No evidence of deep venous in the bilateral upper extremity veins. 

2. The left cephalic vein could not be identified. 

3. Complex fluid collection anteriorly at the right shoulder, which may 

represent a hematoma. Abscess is not excluded, although no significant 

associated hyperemia is seen on color Doppler imaging. Please correlate with 

physical exam. Aspiration can be performed if clinically indicated. 



Electronically signed by: Seda Meyer On 07/14/2021  06:57:07 AM

## 2021-07-27 ENCOUNTER — HOSPITAL ENCOUNTER (EMERGENCY)
Dept: HOSPITAL 53 - M ED | Age: 36
Discharge: HOME | End: 2021-07-27
Payer: COMMERCIAL

## 2021-07-27 VITALS — HEIGHT: 68 IN | WEIGHT: 150.31 LBS | BODY MASS INDEX: 22.78 KG/M2

## 2021-07-27 VITALS — SYSTOLIC BLOOD PRESSURE: 156 MMHG | DIASTOLIC BLOOD PRESSURE: 103 MMHG

## 2021-07-27 DIAGNOSIS — M25.511: Primary | ICD-10-CM

## 2021-07-27 DIAGNOSIS — B19.9: ICD-10-CM

## 2021-07-27 DIAGNOSIS — F17.210: ICD-10-CM

## 2021-07-27 DIAGNOSIS — I10: ICD-10-CM

## 2021-07-27 DIAGNOSIS — K21.9: ICD-10-CM

## 2021-07-27 DIAGNOSIS — D72.829: ICD-10-CM

## 2021-07-27 DIAGNOSIS — F15.20: ICD-10-CM

## 2021-07-27 DIAGNOSIS — Z86.19: ICD-10-CM

## 2021-07-27 DIAGNOSIS — M25.512: ICD-10-CM

## 2021-07-27 LAB
ALBUMIN SERPL BCG-MCNC: 3.1 GM/DL (ref 3.2–5.2)
ALT SERPL W P-5'-P-CCNC: 98 U/L (ref 12–78)
AMPHETAMINES UR QL SCN: NEGATIVE
APTT BLD: 30.3 SECONDS (ref 24.2–38.5)
BARBITURATES UR QL SCN: NEGATIVE
BASOPHILS # BLD AUTO: 0.1 10^3/UL (ref 0–0.2)
BASOPHILS NFR BLD AUTO: 0.4 % (ref 0–1)
BENZODIAZ UR QL SCN: NEGATIVE
BILIRUB CONJ SERPL-MCNC: < 0.1 MG/DL (ref 0–0.2)
BILIRUB SERPL-MCNC: 0.2 MG/DL (ref 0.2–1)
BZE UR QL SCN: NEGATIVE
CANNABINOIDS UR QL SCN: POSITIVE
CK MB CFR.DF SERPL CALC: 2.56
CK MB SERPL-MCNC: < 1 NG/ML (ref ?–3.6)
CK SERPL-CCNC: 39 U/L (ref 39–308)
CRP SERPL-MCNC: 5 MG/DL (ref 0–0.3)
EOSINOPHIL # BLD AUTO: 0 10^3/UL (ref 0–0.5)
EOSINOPHIL NFR BLD AUTO: 0.2 % (ref 0–3)
HCT VFR BLD AUTO: 41.3 % (ref 42–52)
HGB BLD-MCNC: 13 G/DL (ref 13.5–17.5)
INR PPP: 1
LIPASE SERPL-CCNC: 86 U/L (ref 73–393)
LYMPHOCYTES # BLD AUTO: 2.7 10^3/UL (ref 1.5–5)
LYMPHOCYTES NFR BLD AUTO: 14.8 % (ref 24–44)
MCH RBC QN AUTO: 27.4 PG (ref 27–33)
MCHC RBC AUTO-ENTMCNC: 31.5 G/DL (ref 32–36.5)
MCV RBC AUTO: 86.9 FL (ref 80–96)
METHADONE UR QL SCN: NEGATIVE
MONOCYTES # BLD AUTO: 1.2 10^3/UL (ref 0–0.8)
MONOCYTES NFR BLD AUTO: 6.4 % (ref 2–8)
NEUTROPHILS # BLD AUTO: 14.3 10^3/UL (ref 1.5–8.5)
NEUTROPHILS NFR BLD AUTO: 77.8 % (ref 36–66)
OPIATES UR QL SCN: NEGATIVE
PCP UR QL SCN: NEGATIVE
PLATELET # BLD AUTO: 470 10^3/UL (ref 150–450)
PROT SERPL-MCNC: 9.2 GM/DL (ref 6.4–8.2)
PROTHROMBIN TIME: 13.4 SECONDS (ref 12.5–14.3)
RBC # BLD AUTO: 4.75 10^6/UL (ref 4.3–6.1)
TROPONIN I SERPL-MCNC: < 0.02 NG/ML (ref ?–0.1)
WBC # BLD AUTO: 18.4 10^3/UL (ref 4–10)

## 2021-07-27 PROCEDURE — 85025 COMPLETE CBC W/AUTO DIFF WBC: CPT

## 2021-07-27 PROCEDURE — 36415 COLL VENOUS BLD VENIPUNCTURE: CPT

## 2021-07-27 PROCEDURE — 73030 X-RAY EXAM OF SHOULDER: CPT

## 2021-07-27 PROCEDURE — 96361 HYDRATE IV INFUSION ADD-ON: CPT

## 2021-07-27 PROCEDURE — 99285 EMERGENCY DEPT VISIT HI MDM: CPT

## 2021-07-27 PROCEDURE — 93041 RHYTHM ECG TRACING: CPT

## 2021-07-27 PROCEDURE — 85610 PROTHROMBIN TIME: CPT

## 2021-07-27 PROCEDURE — 82550 ASSAY OF CK (CPK): CPT

## 2021-07-27 PROCEDURE — 85730 THROMBOPLASTIN TIME PARTIAL: CPT

## 2021-07-27 PROCEDURE — 86140 C-REACTIVE PROTEIN: CPT

## 2021-07-27 PROCEDURE — 96374 THER/PROPH/DIAG INJ IV PUSH: CPT

## 2021-07-27 PROCEDURE — 83605 ASSAY OF LACTIC ACID: CPT

## 2021-07-27 PROCEDURE — 82553 CREATINE MB FRACTION: CPT

## 2021-07-27 PROCEDURE — 80307 DRUG TEST PRSMV CHEM ANLYZR: CPT

## 2021-07-27 PROCEDURE — 80076 HEPATIC FUNCTION PANEL: CPT

## 2021-07-27 PROCEDURE — 83690 ASSAY OF LIPASE: CPT

## 2021-07-27 PROCEDURE — 80047 BASIC METABLC PNL IONIZED CA: CPT

## 2021-07-27 PROCEDURE — 87040 BLOOD CULTURE FOR BACTERIA: CPT

## 2021-12-10 ENCOUNTER — HOSPITAL ENCOUNTER (EMERGENCY)
Dept: HOSPITAL 53 - M ED | Age: 36
LOS: 1 days | Discharge: PSYCHIATRIC OR SUBSTANCE USE FACILITY | End: 2021-12-11
Payer: COMMERCIAL

## 2021-12-10 VITALS — HEIGHT: 69 IN | BODY MASS INDEX: 28.94 KG/M2 | WEIGHT: 195.42 LBS

## 2021-12-10 DIAGNOSIS — F15.20: ICD-10-CM

## 2021-12-10 DIAGNOSIS — R45.851: Primary | ICD-10-CM

## 2021-12-10 DIAGNOSIS — F17.210: ICD-10-CM

## 2021-12-10 DIAGNOSIS — Z79.899: ICD-10-CM

## 2021-12-10 DIAGNOSIS — F33.9: ICD-10-CM

## 2021-12-10 LAB
ALBUMIN SERPL BCG-MCNC: 3.8 GM/DL (ref 3.2–5.2)
ALT SERPL W P-5'-P-CCNC: 45 U/L (ref 12–78)
AMPHETAMINES UR QL SCN: NEGATIVE
APAP SERPL-MCNC: < 2 UG/ML (ref 10–30)
BARBITURATES UR QL SCN: NEGATIVE
BENZODIAZ UR QL SCN: NEGATIVE
BILIRUB CONJ SERPL-MCNC: 0.1 MG/DL (ref 0–0.2)
BILIRUB SERPL-MCNC: 0.3 MG/DL (ref 0.2–1)
BUN SERPL-MCNC: 12 MG/DL (ref 7–18)
BZE UR QL SCN: NEGATIVE
CALCIUM SERPL-MCNC: 9 MG/DL (ref 8.5–10.1)
CANNABINOIDS UR QL SCN: NEGATIVE
CHLORIDE SERPL-SCNC: 104 MEQ/L (ref 98–107)
CO2 SERPL-SCNC: 27 MEQ/L (ref 21–32)
CREAT SERPL-MCNC: 0.67 MG/DL (ref 0.7–1.3)
ETHANOL SERPL-MCNC: < 0.003 % (ref 0–0.01)
GFR SERPL CREATININE-BSD FRML MDRD: > 60 ML/MIN/{1.73_M2} (ref 60–?)
GLUCOSE SERPL-MCNC: 118 MG/DL (ref 70–100)
HCT VFR BLD AUTO: 32.6 % (ref 42–52)
HGB BLD-MCNC: 10.9 G/DL (ref 13.5–17.5)
MCH RBC QN AUTO: 27.6 PG (ref 27–33)
MCHC RBC AUTO-ENTMCNC: 33.4 G/DL (ref 32–36.5)
MCV RBC AUTO: 82.5 FL (ref 80–96)
METHADONE UR QL SCN: NEGATIVE
OPIATES UR QL SCN: POSITIVE
PCP UR QL SCN: NEGATIVE
PLATELET # BLD AUTO: 327 10^3/UL (ref 150–450)
POTASSIUM SERPL-SCNC: 3.6 MEQ/L (ref 3.5–5.1)
PROT SERPL-MCNC: 7.3 GM/DL (ref 6.4–8.2)
RBC # BLD AUTO: 3.95 10^6/UL (ref 4.3–6.1)
RSV RNA NPH QL NAA+PROBE: NEGATIVE
SALICYLATES SERPL-MCNC: < 1.7 MG/DL (ref 5–30)
SODIUM SERPL-SCNC: 138 MEQ/L (ref 136–145)
TSH SERPL DL<=0.005 MIU/L-ACNC: 1.16 UIU/ML (ref 0.36–3.74)
WBC # BLD AUTO: 8.5 10^3/UL (ref 4–10)

## 2021-12-10 RX ADMIN — GABAPENTIN SCH MG: 300 CAPSULE ORAL at 18:12

## 2021-12-10 RX ADMIN — GABAPENTIN SCH MG: 300 CAPSULE ORAL at 09:00

## 2021-12-10 RX ADMIN — SERTRALINE HYDROCHLORIDE SCH MG: 50 TABLET ORAL at 11:00

## 2021-12-10 RX ADMIN — GABAPENTIN SCH MG: 300 CAPSULE ORAL at 21:00

## 2021-12-11 VITALS — DIASTOLIC BLOOD PRESSURE: 88 MMHG | SYSTOLIC BLOOD PRESSURE: 142 MMHG

## 2021-12-11 RX ADMIN — GABAPENTIN SCH MG: 300 CAPSULE ORAL at 09:00

## 2021-12-11 RX ADMIN — SERTRALINE HYDROCHLORIDE SCH MG: 50 TABLET ORAL at 09:00

## 2021-12-11 NOTE — ECGEPIP
[facility] - ED

                                       

                                       Test Date:    2021

Pat Name:     DAVID NAVARRETE           Department:   

Patient ID:   [id number]                 Room:         -

Gender:       Male                     Technician:   

:          1985               Requested By: MAHENDRA PROCTOR PA-C

Order Number: [id number]     Reading MD:   Aruna Hendrix

                                 Measurements

Intervals                              Axis          

Rate:         89                       P:            13

IL:           132                      QRS:          45

QRSD:         78                       T:            45

QT:           372                                    

QTc:          452                                    

                           Interpretive Statements

Normal sinus rhythm

NSTTW abnormalities

similar 19

Electronically Signed on 2021 21:20:57 EDT by Aruna Hendrix [facility]

## 2023-06-16 ENCOUNTER — HOSPITAL ENCOUNTER (INPATIENT)
Dept: HOSPITAL 53 - M ED | Age: 38
LOS: 4 days | Discharge: PSYCHIATRIC OR SUBSTANCE USE FACILITY | DRG: 383 | End: 2023-06-20
Attending: STUDENT IN AN ORGANIZED HEALTH CARE EDUCATION/TRAINING PROGRAM | Admitting: STUDENT IN AN ORGANIZED HEALTH CARE EDUCATION/TRAINING PROGRAM
Payer: COMMERCIAL

## 2023-06-16 VITALS — TEMPERATURE: 98.2 F | OXYGEN SATURATION: 93 % | SYSTOLIC BLOOD PRESSURE: 139 MMHG | DIASTOLIC BLOOD PRESSURE: 72 MMHG

## 2023-06-16 VITALS — WEIGHT: 150.31 LBS | BODY MASS INDEX: 22.78 KG/M2 | HEIGHT: 68 IN

## 2023-06-16 DIAGNOSIS — Z98.1: ICD-10-CM

## 2023-06-16 DIAGNOSIS — Z87.820: ICD-10-CM

## 2023-06-16 DIAGNOSIS — F11.23: ICD-10-CM

## 2023-06-16 DIAGNOSIS — F32.89: ICD-10-CM

## 2023-06-16 DIAGNOSIS — I10: ICD-10-CM

## 2023-06-16 DIAGNOSIS — Z79.899: ICD-10-CM

## 2023-06-16 DIAGNOSIS — L08.89: Primary | ICD-10-CM

## 2023-06-16 DIAGNOSIS — F43.10: ICD-10-CM

## 2023-06-16 DIAGNOSIS — F32.A: ICD-10-CM

## 2023-06-16 DIAGNOSIS — B19.20: ICD-10-CM

## 2023-06-16 DIAGNOSIS — Z91.51: ICD-10-CM

## 2023-06-16 DIAGNOSIS — R45.851: ICD-10-CM

## 2023-06-16 DIAGNOSIS — F12.10: ICD-10-CM

## 2023-06-16 DIAGNOSIS — K21.9: ICD-10-CM

## 2023-06-16 DIAGNOSIS — D72.829: ICD-10-CM

## 2023-06-16 DIAGNOSIS — M79.89: ICD-10-CM

## 2023-06-16 LAB
ALBUMIN SERPL BCG-MCNC: 3.2 G/DL (ref 3.2–5.2)
ALP SERPL-CCNC: 136 U/L (ref 46–116)
ALT SERPL W P-5'-P-CCNC: 23 U/L (ref 7–40)
AMPHETAMINES UR QL SCN: NEGATIVE
APAP SERPL-MCNC: < 2 UG/ML (ref 10–20)
AST SERPL-CCNC: 8 U/L (ref ?–34)
BARBITURATES UR QL SCN: NEGATIVE
BASOPHILS # BLD AUTO: 0 10^3/UL (ref 0–0.2)
BASOPHILS NFR BLD AUTO: 0.2 % (ref 0–1)
BENZODIAZ UR QL SCN: NEGATIVE
BILIRUB CONJ SERPL-MCNC: 0.1 MG/DL (ref ?–0.4)
BILIRUB SERPL-MCNC: 0.3 MG/DL (ref 0.3–1.2)
BUN SERPL-MCNC: 13 MG/DL (ref 9–23)
BZE UR QL SCN: NEGATIVE
CALCIUM SERPL-MCNC: 8 MG/DL (ref 8.5–10.1)
CANNABINOIDS UR QL SCN: POSITIVE
CHLORIDE SERPL-SCNC: 106 MMOL/L (ref 98–107)
CK SERPL-CCNC: 24 U/L (ref 46–171)
CO2 SERPL-SCNC: 26 MMOL/L (ref 20–31)
CREAT SERPL-MCNC: 0.69 MG/DL (ref 0.7–1.3)
CRP SERPL-MCNC: 2.1 MG/DL (ref ?–1)
EOSINOPHIL # BLD AUTO: 0 10^3/UL (ref 0–0.5)
EOSINOPHIL NFR BLD AUTO: 0.3 % (ref 0–3)
ERYTHROCYTE [SEDIMENTATION RATE] IN BLOOD BY WESTERGREN METHOD: 38 MM/HR (ref 0–15)
ETHANOL SERPL-MCNC: < 0.003 % (ref 0–0.01)
GFR SERPL CREATININE-BSD FRML MDRD: > 60 ML/MIN/{1.73_M2} (ref 60–?)
GLUCOSE SERPL-MCNC: 97 MG/DL (ref 60–100)
HBV CORE IGM SER QL: NEGATIVE
HBV SURFACE AB SER-ACNC: NEGATIVE M[IU]/ML
HCT VFR BLD AUTO: 37.7 % (ref 42–52)
HCV AB SER QL: > 11 INDEX (ref ?–0.8)
HGB BLD-MCNC: 12.5 G/DL (ref 13.5–17.5)
LYMPHOCYTES # BLD AUTO: 2.8 10^3/UL (ref 1.5–5)
LYMPHOCYTES NFR BLD AUTO: 21.4 % (ref 24–44)
MCH RBC QN AUTO: 27.1 PG (ref 27–33)
MCHC RBC AUTO-ENTMCNC: 33.2 G/DL (ref 32–36.5)
MCV RBC AUTO: 81.8 FL (ref 80–96)
METHADONE UR QL SCN: NEGATIVE
MONOCYTES # BLD AUTO: 0.9 10^3/UL (ref 0–0.8)
MONOCYTES NFR BLD AUTO: 6.8 % (ref 2–8)
NEUTROPHILS # BLD AUTO: 9.1 10^3/UL (ref 1.5–8.5)
NEUTROPHILS NFR BLD AUTO: 71 % (ref 36–66)
OPIATES UR QL SCN: NEGATIVE
PCP UR QL SCN: NEGATIVE
PLATELET # BLD AUTO: 336 10^3/UL (ref 150–450)
POTASSIUM SERPL-SCNC: 3.2 MMOL/L (ref 3.5–5.1)
PROT SERPL-MCNC: 6.3 G/DL (ref 5.7–8.2)
RBC # BLD AUTO: 4.61 10^6/UL (ref 4.3–6.1)
RSV RNA NPH QL NAA+PROBE: NEGATIVE
SALICYLATES SERPL-MCNC: < 3 MG/DL (ref ?–30)
SODIUM SERPL-SCNC: 138 MMOL/L (ref 136–145)
TSH SERPL DL<=0.005 MIU/L-ACNC: 0.7 UIU/ML (ref 0.55–4.78)
WBC # BLD AUTO: 12.9 10^3/UL (ref 4–10)

## 2023-06-17 VITALS — TEMPERATURE: 98.6 F | SYSTOLIC BLOOD PRESSURE: 150 MMHG | OXYGEN SATURATION: 92 % | DIASTOLIC BLOOD PRESSURE: 90 MMHG

## 2023-06-17 VITALS — OXYGEN SATURATION: 97 % | TEMPERATURE: 98.6 F | SYSTOLIC BLOOD PRESSURE: 136 MMHG | DIASTOLIC BLOOD PRESSURE: 76 MMHG

## 2023-06-17 VITALS — DIASTOLIC BLOOD PRESSURE: 69 MMHG | OXYGEN SATURATION: 98 % | SYSTOLIC BLOOD PRESSURE: 126 MMHG | TEMPERATURE: 98.8 F

## 2023-06-17 VITALS — DIASTOLIC BLOOD PRESSURE: 68 MMHG | SYSTOLIC BLOOD PRESSURE: 127 MMHG | TEMPERATURE: 99 F

## 2023-06-17 VITALS — TEMPERATURE: 97.9 F | SYSTOLIC BLOOD PRESSURE: 144 MMHG | OXYGEN SATURATION: 98 % | DIASTOLIC BLOOD PRESSURE: 87 MMHG

## 2023-06-17 VITALS — OXYGEN SATURATION: 94 % | SYSTOLIC BLOOD PRESSURE: 140 MMHG | DIASTOLIC BLOOD PRESSURE: 68 MMHG | TEMPERATURE: 98.2 F

## 2023-06-17 VITALS — DIASTOLIC BLOOD PRESSURE: 86 MMHG | SYSTOLIC BLOOD PRESSURE: 137 MMHG

## 2023-06-17 LAB
ASO AB SERPL-ACNC: 285 IU/ML (ref ?–195)
BUN SERPL-MCNC: 9 MG/DL (ref 9–23)
CALCIUM SERPL-MCNC: 9 MG/DL (ref 8.5–10.1)
CHLORIDE SERPL-SCNC: 103 MMOL/L (ref 98–107)
CO2 SERPL-SCNC: 25 MMOL/L (ref 20–31)
CREAT SERPL-MCNC: 0.64 MG/DL (ref 0.7–1.3)
CRP SERPL-MCNC: 1.7 MG/DL (ref ?–1)
GFR SERPL CREATININE-BSD FRML MDRD: > 60 ML/MIN/{1.73_M2} (ref 60–?)
GLUCOSE SERPL-MCNC: 174 MG/DL (ref 60–100)
HCT VFR BLD AUTO: 42.3 % (ref 42–52)
HGB BLD-MCNC: 13.8 G/DL (ref 13.5–17.5)
MCH RBC QN AUTO: 27 PG (ref 27–33)
MCHC RBC AUTO-ENTMCNC: 32.6 G/DL (ref 32–36.5)
MCV RBC AUTO: 82.8 FL (ref 80–96)
PLATELET # BLD AUTO: 352 10^3/UL (ref 150–450)
POTASSIUM SERPL-SCNC: 3.5 MMOL/L (ref 3.5–5.1)
RBC # BLD AUTO: 5.11 10^6/UL (ref 4.3–6.1)
SODIUM SERPL-SCNC: 139 MMOL/L (ref 136–145)
WBC # BLD AUTO: 8.5 10^3/UL (ref 4–10)

## 2023-06-17 RX ADMIN — NICOTINE PRN PATCH: 21 PATCH, EXTENDED RELEASE TRANSDERMAL at 11:15

## 2023-06-17 RX ADMIN — DEXTROSE MONOHYDRATE SCH MLS/HR: 5 INJECTION INTRAVENOUS at 20:24

## 2023-06-17 RX ADMIN — DEXTROSE MONOHYDRATE SCH MLS/HR: 50 INJECTION, SOLUTION INTRAVENOUS at 03:49

## 2023-06-17 RX ADMIN — ACETAMINOPHEN PRN MG: 325 TABLET ORAL at 08:48

## 2023-06-17 RX ADMIN — OMEPRAZOLE SCH MG: 20 CAPSULE, DELAYED RELEASE ORAL at 08:48

## 2023-06-17 RX ADMIN — ONDANSETRON HYDROCHLORIDE PRN MG: 4 TABLET, FILM COATED ORAL at 05:56

## 2023-06-17 RX ADMIN — ONDANSETRON HYDROCHLORIDE PRN MG: 4 TABLET, FILM COATED ORAL at 12:48

## 2023-06-17 RX ADMIN — SERTRALINE HYDROCHLORIDE SCH MG: 100 TABLET ORAL at 08:48

## 2023-06-17 RX ADMIN — DEXTROSE MONOHYDRATE SCH MLS/HR: 50 INJECTION, SOLUTION INTRAVENOUS at 11:15

## 2023-06-17 RX ADMIN — ACETAMINOPHEN PRN MG: 325 TABLET ORAL at 14:31

## 2023-06-17 RX ADMIN — METHADONE HYDROCHLORIDE SCH MG: 5 TABLET ORAL at 23:31

## 2023-06-17 RX ADMIN — ENOXAPARIN SODIUM SCH MG: 40 INJECTION SUBCUTANEOUS at 08:49

## 2023-06-17 RX ADMIN — DEXTROSE MONOHYDRATE SCH MLS/HR: 5 INJECTION INTRAVENOUS at 08:48

## 2023-06-17 RX ADMIN — DEXTROSE MONOHYDRATE SCH MLS/HR: 5 INJECTION INTRAVENOUS at 14:29

## 2023-06-17 RX ADMIN — DEXTROSE MONOHYDRATE SCH MLS/HR: 50 INJECTION, SOLUTION INTRAVENOUS at 20:23

## 2023-06-18 VITALS — DIASTOLIC BLOOD PRESSURE: 76 MMHG | TEMPERATURE: 97.9 F | OXYGEN SATURATION: 98 % | SYSTOLIC BLOOD PRESSURE: 128 MMHG

## 2023-06-18 VITALS — DIASTOLIC BLOOD PRESSURE: 90 MMHG | SYSTOLIC BLOOD PRESSURE: 142 MMHG

## 2023-06-18 VITALS — OXYGEN SATURATION: 98 % | SYSTOLIC BLOOD PRESSURE: 138 MMHG | DIASTOLIC BLOOD PRESSURE: 78 MMHG | TEMPERATURE: 98.1 F

## 2023-06-18 VITALS — SYSTOLIC BLOOD PRESSURE: 152 MMHG | DIASTOLIC BLOOD PRESSURE: 84 MMHG | TEMPERATURE: 98.1 F | OXYGEN SATURATION: 98 %

## 2023-06-18 VITALS — SYSTOLIC BLOOD PRESSURE: 133 MMHG | DIASTOLIC BLOOD PRESSURE: 80 MMHG | OXYGEN SATURATION: 98 % | TEMPERATURE: 97.9 F

## 2023-06-18 VITALS — DIASTOLIC BLOOD PRESSURE: 70 MMHG | SYSTOLIC BLOOD PRESSURE: 130 MMHG | TEMPERATURE: 98.1 F | OXYGEN SATURATION: 98 %

## 2023-06-18 VITALS — SYSTOLIC BLOOD PRESSURE: 142 MMHG | OXYGEN SATURATION: 98 % | DIASTOLIC BLOOD PRESSURE: 78 MMHG | TEMPERATURE: 98.6 F

## 2023-06-18 LAB
BASOPHILS # BLD AUTO: 0 10^3/UL (ref 0–0.2)
BASOPHILS NFR BLD AUTO: 0.4 % (ref 0–1)
BUN SERPL-MCNC: 9 MG/DL (ref 9–23)
CALCIUM SERPL-MCNC: 8.7 MG/DL (ref 8.5–10.1)
CHLORIDE SERPL-SCNC: 105 MMOL/L (ref 98–107)
CO2 SERPL-SCNC: 27 MMOL/L (ref 20–31)
CREAT SERPL-MCNC: 0.74 MG/DL (ref 0.7–1.3)
CRP SERPL-MCNC: 0.9 MG/DL (ref ?–1)
EOSINOPHIL # BLD AUTO: 0.1 10^3/UL (ref 0–0.5)
EOSINOPHIL NFR BLD AUTO: 0.6 % (ref 0–3)
GFR SERPL CREATININE-BSD FRML MDRD: > 60 ML/MIN/{1.73_M2} (ref 60–?)
GLUCOSE SERPL-MCNC: 94 MG/DL (ref 60–100)
HCT VFR BLD AUTO: 43.3 % (ref 42–52)
HGB BLD-MCNC: 13.9 G/DL (ref 13.5–17.5)
LYMPHOCYTES # BLD AUTO: 2.2 10^3/UL (ref 1.5–5)
LYMPHOCYTES NFR BLD AUTO: 28.3 % (ref 24–44)
MAGNESIUM SERPL-MCNC: 2 MG/DL (ref 1.8–2.4)
MCH RBC QN AUTO: 26.9 PG (ref 27–33)
MCHC RBC AUTO-ENTMCNC: 32.1 G/DL (ref 32–36.5)
MCV RBC AUTO: 83.9 FL (ref 80–96)
MONOCYTES # BLD AUTO: 0.7 10^3/UL (ref 0–0.8)
MONOCYTES NFR BLD AUTO: 9.2 % (ref 2–8)
NEUTROPHILS # BLD AUTO: 4.7 10^3/UL (ref 1.5–8.5)
NEUTROPHILS NFR BLD AUTO: 61.1 % (ref 36–66)
PLATELET # BLD AUTO: 318 10^3/UL (ref 150–450)
POTASSIUM SERPL-SCNC: 4.1 MMOL/L (ref 3.5–5.1)
RBC # BLD AUTO: 5.16 10^6/UL (ref 4.3–6.1)
SODIUM SERPL-SCNC: 140 MMOL/L (ref 136–145)
WBC # BLD AUTO: 7.8 10^3/UL (ref 4–10)

## 2023-06-18 PROCEDURE — B246ZZZ ULTRASONOGRAPHY OF RIGHT AND LEFT HEART: ICD-10-PCS | Performed by: INTERNAL MEDICINE

## 2023-06-18 RX ADMIN — DEXTROSE MONOHYDRATE SCH MLS/HR: 5 INJECTION INTRAVENOUS at 22:39

## 2023-06-18 RX ADMIN — DEXTROSE MONOHYDRATE SCH MLS/HR: 5 INJECTION INTRAVENOUS at 02:28

## 2023-06-18 RX ADMIN — ENOXAPARIN SODIUM SCH MG: 40 INJECTION SUBCUTANEOUS at 08:21

## 2023-06-18 RX ADMIN — DEXTROSE MONOHYDRATE SCH MLS/HR: 5 INJECTION INTRAVENOUS at 08:20

## 2023-06-18 RX ADMIN — METHADONE HYDROCHLORIDE SCH MG: 5 TABLET ORAL at 20:39

## 2023-06-18 RX ADMIN — NICOTINE PRN PATCH: 21 PATCH, EXTENDED RELEASE TRANSDERMAL at 12:13

## 2023-06-18 RX ADMIN — DEXTROSE MONOHYDRATE SCH MLS/HR: 5 INJECTION INTRAVENOUS at 14:00

## 2023-06-18 RX ADMIN — OMEPRAZOLE SCH MG: 20 CAPSULE, DELAYED RELEASE ORAL at 08:20

## 2023-06-18 RX ADMIN — DEXTROSE MONOHYDRATE SCH MLS/HR: 50 INJECTION, SOLUTION INTRAVENOUS at 03:54

## 2023-06-18 RX ADMIN — METHADONE HYDROCHLORIDE SCH MG: 5 TABLET ORAL at 15:16

## 2023-06-18 RX ADMIN — METHADONE HYDROCHLORIDE SCH MG: 5 TABLET ORAL at 08:20

## 2023-06-18 RX ADMIN — DEXTROSE MONOHYDRATE SCH MLS/HR: 50 INJECTION, SOLUTION INTRAVENOUS at 20:38

## 2023-06-18 RX ADMIN — ACETAMINOPHEN PRN MG: 325 TABLET ORAL at 15:17

## 2023-06-18 RX ADMIN — DEXTROSE MONOHYDRATE SCH MLS/HR: 50 INJECTION, SOLUTION INTRAVENOUS at 12:13

## 2023-06-18 RX ADMIN — SERTRALINE HYDROCHLORIDE SCH MG: 100 TABLET ORAL at 08:20

## 2023-06-19 VITALS — OXYGEN SATURATION: 98 % | DIASTOLIC BLOOD PRESSURE: 90 MMHG | SYSTOLIC BLOOD PRESSURE: 146 MMHG | TEMPERATURE: 97.9 F

## 2023-06-19 VITALS — TEMPERATURE: 99.3 F | DIASTOLIC BLOOD PRESSURE: 76 MMHG | OXYGEN SATURATION: 98 % | SYSTOLIC BLOOD PRESSURE: 132 MMHG

## 2023-06-19 VITALS — TEMPERATURE: 98.6 F | OXYGEN SATURATION: 98 % | DIASTOLIC BLOOD PRESSURE: 78 MMHG | SYSTOLIC BLOOD PRESSURE: 140 MMHG

## 2023-06-19 VITALS — TEMPERATURE: 97.9 F | OXYGEN SATURATION: 98 % | SYSTOLIC BLOOD PRESSURE: 142 MMHG | DIASTOLIC BLOOD PRESSURE: 70 MMHG

## 2023-06-19 VITALS — OXYGEN SATURATION: 98 % | TEMPERATURE: 97.3 F | DIASTOLIC BLOOD PRESSURE: 80 MMHG | SYSTOLIC BLOOD PRESSURE: 142 MMHG

## 2023-06-19 VITALS — DIASTOLIC BLOOD PRESSURE: 68 MMHG | TEMPERATURE: 97.9 F | OXYGEN SATURATION: 97 % | SYSTOLIC BLOOD PRESSURE: 136 MMHG

## 2023-06-19 LAB
BASOPHILS # BLD AUTO: 0.1 10^3/UL (ref 0–0.2)
BASOPHILS NFR BLD AUTO: 0.6 % (ref 0–1)
BUN SERPL-MCNC: 10 MG/DL (ref 9–23)
CALCIUM SERPL-MCNC: 9.3 MG/DL (ref 8.5–10.1)
CHLORIDE SERPL-SCNC: 106 MMOL/L (ref 98–107)
CO2 SERPL-SCNC: 25 MMOL/L (ref 20–31)
CREAT SERPL-MCNC: 0.68 MG/DL (ref 0.7–1.3)
CRP SERPL-MCNC: 0.4 MG/DL (ref ?–1)
EOSINOPHIL # BLD AUTO: 0.1 10^3/UL (ref 0–0.5)
EOSINOPHIL NFR BLD AUTO: 0.8 % (ref 0–3)
GFR SERPL CREATININE-BSD FRML MDRD: > 60 ML/MIN/{1.73_M2} (ref 60–?)
GLUCOSE SERPL-MCNC: 97 MG/DL (ref 60–100)
HCT VFR BLD AUTO: 44.2 % (ref 42–52)
HGB BLD-MCNC: 13.9 G/DL (ref 13.5–17.5)
LYMPHOCYTES # BLD AUTO: 2.2 10^3/UL (ref 1.5–5)
LYMPHOCYTES NFR BLD AUTO: 28.9 % (ref 24–44)
MAGNESIUM SERPL-MCNC: 1.8 MG/DL (ref 1.8–2.4)
MCH RBC QN AUTO: 26.6 PG (ref 27–33)
MCHC RBC AUTO-ENTMCNC: 31.4 G/DL (ref 32–36.5)
MCV RBC AUTO: 84.5 FL (ref 80–96)
MONOCYTES # BLD AUTO: 0.6 10^3/UL (ref 0–0.8)
MONOCYTES NFR BLD AUTO: 8 % (ref 2–8)
NEUTROPHILS # BLD AUTO: 4.7 10^3/UL (ref 1.5–8.5)
NEUTROPHILS NFR BLD AUTO: 61.4 % (ref 36–66)
PLATELET # BLD AUTO: 321 10^3/UL (ref 150–450)
POTASSIUM SERPL-SCNC: 4.4 MMOL/L (ref 3.5–5.1)
RBC # BLD AUTO: 5.23 10^6/UL (ref 4.3–6.1)
SODIUM SERPL-SCNC: 140 MMOL/L (ref 136–145)
WBC # BLD AUTO: 7.7 10^3/UL (ref 4–10)

## 2023-06-19 RX ADMIN — DEXTROSE MONOHYDRATE SCH MLS/HR: 5 INJECTION INTRAVENOUS at 02:08

## 2023-06-19 RX ADMIN — METHADONE HYDROCHLORIDE SCH MG: 5 TABLET ORAL at 08:13

## 2023-06-19 RX ADMIN — DOXYCYCLINE HYCLATE SCH MG: 100 TABLET, COATED ORAL at 20:48

## 2023-06-19 RX ADMIN — OMEPRAZOLE SCH MG: 20 CAPSULE, DELAYED RELEASE ORAL at 08:13

## 2023-06-19 RX ADMIN — DEXTROSE MONOHYDRATE SCH MLS/HR: 50 INJECTION, SOLUTION INTRAVENOUS at 04:22

## 2023-06-19 RX ADMIN — ACETAMINOPHEN PRN MG: 325 TABLET ORAL at 15:41

## 2023-06-19 RX ADMIN — DOXYCYCLINE HYCLATE SCH MG: 100 TABLET, COATED ORAL at 12:11

## 2023-06-19 RX ADMIN — ENOXAPARIN SODIUM SCH MG: 40 INJECTION SUBCUTANEOUS at 08:13

## 2023-06-19 RX ADMIN — METHADONE HYDROCHLORIDE SCH MG: 5 TABLET ORAL at 20:49

## 2023-06-19 RX ADMIN — METHADONE HYDROCHLORIDE SCH MG: 5 TABLET ORAL at 15:40

## 2023-06-19 RX ADMIN — SERTRALINE HYDROCHLORIDE SCH MG: 100 TABLET ORAL at 08:13

## 2023-06-19 RX ADMIN — NICOTINE PRN PATCH: 21 PATCH, EXTENDED RELEASE TRANSDERMAL at 10:04

## 2023-06-19 RX ADMIN — DEXTROSE MONOHYDRATE SCH MLS/HR: 5 INJECTION INTRAVENOUS at 08:13

## 2023-06-19 RX ADMIN — DEXTROSE MONOHYDRATE SCH MLS/HR: 5 INJECTION INTRAVENOUS at 08:00

## 2023-06-20 ENCOUNTER — HOSPITAL ENCOUNTER (INPATIENT)
Dept: HOSPITAL 53 - M PSY | Age: 38
LOS: 7 days | Discharge: HOME | DRG: 881 | End: 2023-06-27
Attending: STUDENT IN AN ORGANIZED HEALTH CARE EDUCATION/TRAINING PROGRAM | Admitting: STUDENT IN AN ORGANIZED HEALTH CARE EDUCATION/TRAINING PROGRAM
Payer: COMMERCIAL

## 2023-06-20 VITALS — DIASTOLIC BLOOD PRESSURE: 80 MMHG | TEMPERATURE: 97.9 F | OXYGEN SATURATION: 99 % | SYSTOLIC BLOOD PRESSURE: 132 MMHG

## 2023-06-20 VITALS — SYSTOLIC BLOOD PRESSURE: 140 MMHG | DIASTOLIC BLOOD PRESSURE: 90 MMHG | OXYGEN SATURATION: 99 % | TEMPERATURE: 97.5 F

## 2023-06-20 VITALS — TEMPERATURE: 97.9 F | DIASTOLIC BLOOD PRESSURE: 62 MMHG | SYSTOLIC BLOOD PRESSURE: 118 MMHG | OXYGEN SATURATION: 98 %

## 2023-06-20 VITALS — WEIGHT: 169.76 LBS | HEIGHT: 69 IN | BODY MASS INDEX: 25.14 KG/M2

## 2023-06-20 VITALS — DIASTOLIC BLOOD PRESSURE: 60 MMHG | TEMPERATURE: 97.9 F | OXYGEN SATURATION: 99 % | SYSTOLIC BLOOD PRESSURE: 115 MMHG

## 2023-06-20 VITALS — DIASTOLIC BLOOD PRESSURE: 60 MMHG | SYSTOLIC BLOOD PRESSURE: 118 MMHG

## 2023-06-20 VITALS — OXYGEN SATURATION: 100 % | TEMPERATURE: 97.3 F

## 2023-06-20 DIAGNOSIS — I10: ICD-10-CM

## 2023-06-20 DIAGNOSIS — Z87.820: ICD-10-CM

## 2023-06-20 DIAGNOSIS — F43.10: ICD-10-CM

## 2023-06-20 DIAGNOSIS — K21.9: ICD-10-CM

## 2023-06-20 DIAGNOSIS — Z79.899: ICD-10-CM

## 2023-06-20 DIAGNOSIS — F32.9: Primary | ICD-10-CM

## 2023-06-20 DIAGNOSIS — B19.20: ICD-10-CM

## 2023-06-20 DIAGNOSIS — Z98.1: ICD-10-CM

## 2023-06-20 DIAGNOSIS — F11.20: ICD-10-CM

## 2023-06-20 RX ADMIN — SERTRALINE HYDROCHLORIDE SCH MG: 100 TABLET ORAL at 08:21

## 2023-06-20 RX ADMIN — METHADONE HYDROCHLORIDE SCH MG: 5 TABLET ORAL at 08:21

## 2023-06-20 RX ADMIN — METHADONE HYDROCHLORIDE SCH MG: 5 TABLET ORAL at 20:22

## 2023-06-20 RX ADMIN — OMEPRAZOLE SCH MG: 20 CAPSULE, DELAYED RELEASE ORAL at 08:22

## 2023-06-20 RX ADMIN — NICOTINE PRN PATCH: 21 PATCH, EXTENDED RELEASE TRANSDERMAL at 13:45

## 2023-06-20 RX ADMIN — DOXYCYCLINE HYCLATE SCH MG: 100 TABLET, COATED ORAL at 08:22

## 2023-06-20 RX ADMIN — DOXYCYCLINE HYCLATE SCH MG: 100 TABLET, COATED ORAL at 20:22

## 2023-06-20 RX ADMIN — ENOXAPARIN SODIUM SCH MG: 40 INJECTION SUBCUTANEOUS at 08:23

## 2023-06-20 RX ADMIN — METHADONE HYDROCHLORIDE SCH MG: 5 TABLET ORAL at 16:12

## 2023-06-20 RX ADMIN — OMEPRAZOLE SCH MG: 20 CAPSULE, DELAYED RELEASE ORAL at 22:01

## 2023-06-20 RX ADMIN — ENOXAPARIN SODIUM SCH MG: 40 INJECTION SUBCUTANEOUS at 08:22

## 2023-06-21 VITALS — DIASTOLIC BLOOD PRESSURE: 73 MMHG | SYSTOLIC BLOOD PRESSURE: 125 MMHG | OXYGEN SATURATION: 100 % | TEMPERATURE: 96.9 F

## 2023-06-21 VITALS — SYSTOLIC BLOOD PRESSURE: 102 MMHG | TEMPERATURE: 97.9 F | DIASTOLIC BLOOD PRESSURE: 64 MMHG | OXYGEN SATURATION: 99 %

## 2023-06-21 RX ADMIN — OMEPRAZOLE SCH MG: 20 CAPSULE, DELAYED RELEASE ORAL at 20:40

## 2023-06-21 RX ADMIN — DOXYCYCLINE HYCLATE SCH MG: 100 TABLET, COATED ORAL at 20:41

## 2023-06-21 RX ADMIN — SERTRALINE HYDROCHLORIDE SCH MG: 100 TABLET ORAL at 09:28

## 2023-06-21 RX ADMIN — DOXYCYCLINE HYCLATE SCH MG: 100 TABLET, COATED ORAL at 09:28

## 2023-06-21 RX ADMIN — NICOTINE SCH PATCH: 21 PATCH, EXTENDED RELEASE TRANSDERMAL at 09:27

## 2023-06-22 VITALS — OXYGEN SATURATION: 100 % | TEMPERATURE: 97.8 F | SYSTOLIC BLOOD PRESSURE: 128 MMHG | DIASTOLIC BLOOD PRESSURE: 69 MMHG

## 2023-06-22 VITALS — TEMPERATURE: 99.2 F | DIASTOLIC BLOOD PRESSURE: 60 MMHG | SYSTOLIC BLOOD PRESSURE: 113 MMHG

## 2023-06-22 VITALS — DIASTOLIC BLOOD PRESSURE: 83 MMHG | SYSTOLIC BLOOD PRESSURE: 140 MMHG

## 2023-06-22 RX ADMIN — OMEPRAZOLE SCH MG: 20 CAPSULE, DELAYED RELEASE ORAL at 20:39

## 2023-06-22 RX ADMIN — SERTRALINE HYDROCHLORIDE SCH MG: 100 TABLET ORAL at 09:39

## 2023-06-22 RX ADMIN — METHADONE HYDROCHLORIDE SCH MG: 5 TABLET ORAL at 20:38

## 2023-06-22 RX ADMIN — NICOTINE SCH PATCH: 21 PATCH, EXTENDED RELEASE TRANSDERMAL at 09:39

## 2023-06-22 RX ADMIN — METHADONE HYDROCHLORIDE SCH MG: 5 TABLET ORAL at 15:36

## 2023-06-22 RX ADMIN — DOXYCYCLINE HYCLATE SCH MG: 100 TABLET, COATED ORAL at 20:38

## 2023-06-22 RX ADMIN — DOXYCYCLINE HYCLATE SCH MG: 100 TABLET, COATED ORAL at 09:39

## 2023-06-23 VITALS — TEMPERATURE: 98 F | SYSTOLIC BLOOD PRESSURE: 100 MMHG | DIASTOLIC BLOOD PRESSURE: 56 MMHG | OXYGEN SATURATION: 98 %

## 2023-06-23 VITALS — SYSTOLIC BLOOD PRESSURE: 111 MMHG | DIASTOLIC BLOOD PRESSURE: 82 MMHG | TEMPERATURE: 97.8 F

## 2023-06-23 VITALS — SYSTOLIC BLOOD PRESSURE: 150 MMHG | DIASTOLIC BLOOD PRESSURE: 82 MMHG

## 2023-06-23 RX ADMIN — NICOTINE SCH PATCH: 21 PATCH, EXTENDED RELEASE TRANSDERMAL at 08:29

## 2023-06-23 RX ADMIN — DOXYCYCLINE HYCLATE SCH MG: 100 TABLET, COATED ORAL at 20:13

## 2023-06-23 RX ADMIN — SERTRALINE HYDROCHLORIDE SCH MG: 100 TABLET ORAL at 08:28

## 2023-06-23 RX ADMIN — DOXYCYCLINE HYCLATE SCH MG: 100 TABLET, COATED ORAL at 08:28

## 2023-06-23 RX ADMIN — METHADONE HYDROCHLORIDE SCH MG: 5 TABLET ORAL at 16:42

## 2023-06-23 RX ADMIN — METHADONE HYDROCHLORIDE SCH MG: 5 TABLET ORAL at 08:28

## 2023-06-23 RX ADMIN — METHADONE HYDROCHLORIDE SCH MG: 5 TABLET ORAL at 20:13

## 2023-06-23 RX ADMIN — OMEPRAZOLE SCH MG: 20 CAPSULE, DELAYED RELEASE ORAL at 20:13

## 2023-06-24 VITALS — SYSTOLIC BLOOD PRESSURE: 99 MMHG | DIASTOLIC BLOOD PRESSURE: 58 MMHG | TEMPERATURE: 97.5 F | OXYGEN SATURATION: 98 %

## 2023-06-24 VITALS — TEMPERATURE: 98.9 F | OXYGEN SATURATION: 98 % | DIASTOLIC BLOOD PRESSURE: 60 MMHG | SYSTOLIC BLOOD PRESSURE: 110 MMHG

## 2023-06-24 RX ADMIN — OMEPRAZOLE SCH MG: 20 CAPSULE, DELAYED RELEASE ORAL at 20:17

## 2023-06-24 RX ADMIN — METHADONE HYDROCHLORIDE SCH MG: 5 TABLET ORAL at 16:01

## 2023-06-24 RX ADMIN — SERTRALINE HYDROCHLORIDE SCH MG: 100 TABLET ORAL at 07:49

## 2023-06-24 RX ADMIN — NICOTINE SCH PATCH: 21 PATCH, EXTENDED RELEASE TRANSDERMAL at 07:48

## 2023-06-24 RX ADMIN — METHADONE HYDROCHLORIDE SCH MG: 5 TABLET ORAL at 20:16

## 2023-06-24 RX ADMIN — METHADONE HYDROCHLORIDE SCH MG: 5 TABLET ORAL at 07:49

## 2023-06-25 VITALS — DIASTOLIC BLOOD PRESSURE: 63 MMHG | TEMPERATURE: 98 F | SYSTOLIC BLOOD PRESSURE: 128 MMHG | OXYGEN SATURATION: 96 %

## 2023-06-25 VITALS — TEMPERATURE: 98.3 F | SYSTOLIC BLOOD PRESSURE: 124 MMHG | OXYGEN SATURATION: 96 % | DIASTOLIC BLOOD PRESSURE: 72 MMHG

## 2023-06-25 RX ADMIN — METHADONE HYDROCHLORIDE SCH MG: 5 TABLET ORAL at 07:24

## 2023-06-25 RX ADMIN — METHADONE HYDROCHLORIDE SCH MG: 5 TABLET ORAL at 15:29

## 2023-06-25 RX ADMIN — METHADONE HYDROCHLORIDE SCH MG: 5 TABLET ORAL at 20:31

## 2023-06-25 RX ADMIN — OMEPRAZOLE SCH MG: 20 CAPSULE, DELAYED RELEASE ORAL at 20:31

## 2023-06-25 RX ADMIN — NICOTINE SCH PATCH: 21 PATCH, EXTENDED RELEASE TRANSDERMAL at 07:25

## 2023-06-25 RX ADMIN — SERTRALINE HYDROCHLORIDE SCH MG: 100 TABLET ORAL at 07:24

## 2023-06-26 VITALS — TEMPERATURE: 97 F | SYSTOLIC BLOOD PRESSURE: 128 MMHG | DIASTOLIC BLOOD PRESSURE: 86 MMHG

## 2023-06-26 VITALS — OXYGEN SATURATION: 97 % | SYSTOLIC BLOOD PRESSURE: 128 MMHG | DIASTOLIC BLOOD PRESSURE: 74 MMHG | TEMPERATURE: 97.8 F

## 2023-06-26 VITALS — DIASTOLIC BLOOD PRESSURE: 76 MMHG | SYSTOLIC BLOOD PRESSURE: 120 MMHG

## 2023-06-26 RX ADMIN — METHADONE HYDROCHLORIDE SCH MG: 5 TABLET ORAL at 20:36

## 2023-06-26 RX ADMIN — SERTRALINE HYDROCHLORIDE SCH MG: 100 TABLET ORAL at 07:48

## 2023-06-26 RX ADMIN — NICOTINE SCH PATCH: 21 PATCH, EXTENDED RELEASE TRANSDERMAL at 07:50

## 2023-06-26 RX ADMIN — OMEPRAZOLE SCH MG: 20 CAPSULE, DELAYED RELEASE ORAL at 20:36

## 2023-06-26 RX ADMIN — METHADONE HYDROCHLORIDE SCH MG: 5 TABLET ORAL at 16:10

## 2023-06-26 RX ADMIN — METHADONE HYDROCHLORIDE SCH MG: 5 TABLET ORAL at 07:49

## 2023-06-27 VITALS — DIASTOLIC BLOOD PRESSURE: 74 MMHG | SYSTOLIC BLOOD PRESSURE: 121 MMHG

## 2023-06-27 VITALS — TEMPERATURE: 97.6 F | SYSTOLIC BLOOD PRESSURE: 121 MMHG | OXYGEN SATURATION: 98 % | DIASTOLIC BLOOD PRESSURE: 74 MMHG

## 2023-06-27 RX ADMIN — NICOTINE SCH PATCH: 21 PATCH, EXTENDED RELEASE TRANSDERMAL at 07:46

## 2023-06-27 RX ADMIN — METHADONE HYDROCHLORIDE SCH MG: 5 TABLET ORAL at 07:45

## 2023-06-27 RX ADMIN — SERTRALINE HYDROCHLORIDE SCH MG: 100 TABLET ORAL at 07:44

## 2024-11-14 ENCOUNTER — HOSPITAL ENCOUNTER (EMERGENCY)
Dept: HOSPITAL 53 - M ED | Age: 39
Discharge: HOME | End: 2024-11-14
Payer: OTHER GOVERNMENT

## 2024-11-14 VITALS — HEIGHT: 68 IN | BODY MASS INDEX: 36.29 KG/M2 | WEIGHT: 239.42 LBS

## 2024-11-14 VITALS — DIASTOLIC BLOOD PRESSURE: 94 MMHG | SYSTOLIC BLOOD PRESSURE: 180 MMHG | TEMPERATURE: 97.7 F | OXYGEN SATURATION: 98 %

## 2024-11-14 DIAGNOSIS — Z79.899: ICD-10-CM

## 2024-11-14 DIAGNOSIS — Z79.1: ICD-10-CM

## 2024-11-14 DIAGNOSIS — F12.10: ICD-10-CM

## 2024-11-14 DIAGNOSIS — F43.10: ICD-10-CM

## 2024-11-14 DIAGNOSIS — F17.210: ICD-10-CM

## 2024-11-14 DIAGNOSIS — I10: ICD-10-CM

## 2024-11-14 DIAGNOSIS — L03.111: Primary | ICD-10-CM

## 2024-11-14 RX ADMIN — IBUPROFEN ONE MG: 600 TABLET, FILM COATED ORAL at 14:40
